# Patient Record
Sex: FEMALE | Race: WHITE
[De-identification: names, ages, dates, MRNs, and addresses within clinical notes are randomized per-mention and may not be internally consistent; named-entity substitution may affect disease eponyms.]

---

## 2017-04-09 ENCOUNTER — HOSPITAL ENCOUNTER (EMERGENCY)
Dept: HOSPITAL 83 - ED | Age: 77
Discharge: HOME | End: 2017-04-09
Payer: MEDICARE

## 2017-04-09 VITALS — HEIGHT: 55 IN

## 2017-04-09 VITALS — DIASTOLIC BLOOD PRESSURE: 81 MMHG

## 2017-04-09 DIAGNOSIS — I10: Primary | ICD-10-CM

## 2017-04-09 DIAGNOSIS — Z79.899: ICD-10-CM

## 2017-04-09 LAB
INR BLD: 1.1 (ref 2–3.5)
PROTHROMBIN TIME: 12.2 SECONDS (ref 9–12.4)

## 2017-07-28 ENCOUNTER — HOSPITAL ENCOUNTER (OUTPATIENT)
Dept: HOSPITAL 83 - RAD | Age: 77
End: 2017-07-28
Attending: INTERNAL MEDICINE
Payer: MEDICARE

## 2017-07-28 DIAGNOSIS — M81.0: Primary | ICD-10-CM

## 2017-10-13 ENCOUNTER — HOSPITAL ENCOUNTER (OUTPATIENT)
Dept: HOSPITAL 83 - US | Age: 77
Discharge: HOME | End: 2017-10-13
Attending: INTERNAL MEDICINE
Payer: MEDICARE

## 2017-10-13 DIAGNOSIS — I73.9: Primary | ICD-10-CM

## 2017-10-13 DIAGNOSIS — I10: ICD-10-CM

## 2018-01-04 PROBLEM — I48.91 ATRIAL FIBRILLATION (HCC): Status: ACTIVE | Noted: 2018-01-04

## 2018-01-12 ENCOUNTER — HOSPITAL ENCOUNTER (OUTPATIENT)
Dept: HOSPITAL 83 - ORTHO | Age: 78
Discharge: HOME | End: 2018-01-12
Attending: ORTHOPAEDIC SURGERY
Payer: MEDICARE

## 2018-01-12 DIAGNOSIS — M25.562: Primary | ICD-10-CM

## 2018-01-12 DIAGNOSIS — M17.12: ICD-10-CM

## 2018-02-23 ENCOUNTER — HOSPITAL ENCOUNTER (OUTPATIENT)
Dept: HOSPITAL 83 - ORTHO | Age: 78
Discharge: HOME | End: 2018-02-23
Attending: ORTHOPAEDIC SURGERY
Payer: MEDICARE

## 2018-02-23 DIAGNOSIS — M17.11: Primary | ICD-10-CM

## 2018-03-27 DIAGNOSIS — I48.91 ATRIAL FIBRILLATION, UNSPECIFIED TYPE (HCC): ICD-10-CM

## 2018-04-26 ENCOUNTER — HOSPITAL ENCOUNTER (EMERGENCY)
Dept: HOSPITAL 83 - ED | Age: 78
LOS: 1 days | Discharge: HOME | End: 2018-04-27
Payer: MEDICARE

## 2018-04-26 VITALS — DIASTOLIC BLOOD PRESSURE: 96 MMHG

## 2018-04-26 VITALS — WEIGHT: 151 LBS | HEIGHT: 61.97 IN | BODY MASS INDEX: 27.79 KG/M2

## 2018-04-26 DIAGNOSIS — Z79.82: ICD-10-CM

## 2018-04-26 DIAGNOSIS — L30.9: Primary | ICD-10-CM

## 2018-04-26 DIAGNOSIS — Z79.899: ICD-10-CM

## 2018-04-26 DIAGNOSIS — Z88.0: ICD-10-CM

## 2018-04-26 DIAGNOSIS — I10: ICD-10-CM

## 2018-06-27 ENCOUNTER — HOSPITAL ENCOUNTER (INPATIENT)
Dept: HOSPITAL 83 - ED | Age: 78
LOS: 1 days | Discharge: HOME | DRG: 303 | End: 2018-06-28
Attending: INTERNAL MEDICINE | Admitting: INTERNAL MEDICINE
Payer: MEDICARE

## 2018-06-27 VITALS — SYSTOLIC BLOOD PRESSURE: 163 MMHG | DIASTOLIC BLOOD PRESSURE: 75 MMHG

## 2018-06-27 VITALS — SYSTOLIC BLOOD PRESSURE: 170 MMHG | DIASTOLIC BLOOD PRESSURE: 88 MMHG

## 2018-06-27 VITALS — WEIGHT: 161.19 LBS | HEIGHT: 63 IN | BODY MASS INDEX: 28.56 KG/M2

## 2018-06-27 VITALS — SYSTOLIC BLOOD PRESSURE: 141 MMHG | DIASTOLIC BLOOD PRESSURE: 88 MMHG

## 2018-06-27 VITALS — DIASTOLIC BLOOD PRESSURE: 76 MMHG

## 2018-06-27 VITALS — DIASTOLIC BLOOD PRESSURE: 88 MMHG

## 2018-06-27 DIAGNOSIS — I48.0: ICD-10-CM

## 2018-06-27 DIAGNOSIS — Z79.01: ICD-10-CM

## 2018-06-27 DIAGNOSIS — Z79.899: ICD-10-CM

## 2018-06-27 DIAGNOSIS — Z82.49: ICD-10-CM

## 2018-06-27 DIAGNOSIS — I25.110: Primary | ICD-10-CM

## 2018-06-27 DIAGNOSIS — I48.2: ICD-10-CM

## 2018-06-27 DIAGNOSIS — A52.16: ICD-10-CM

## 2018-06-27 DIAGNOSIS — I10: ICD-10-CM

## 2018-06-27 DIAGNOSIS — M17.0: ICD-10-CM

## 2018-06-27 DIAGNOSIS — Z88.0: ICD-10-CM

## 2018-06-27 DIAGNOSIS — Z79.82: ICD-10-CM

## 2018-06-27 DIAGNOSIS — G62.9: ICD-10-CM

## 2018-06-27 LAB
ALBUMIN SERPL-MCNC: 3.5 GM/DL (ref 3.1–4.5)
ALP SERPL-CCNC: 68 U/L (ref 45–117)
ALT SERPL W P-5'-P-CCNC: 29 U/L (ref 12–78)
APPEARANCE UR: CLEAR
APTT PPP: 35.7 SECONDS (ref 20.8–31.5)
AST SERPL-CCNC: 16 IU/L (ref 3–35)
BASOPHILS # BLD AUTO: 0 10*3/UL (ref 0–0.1)
BASOPHILS NFR BLD AUTO: 0.8 % (ref 0–1)
BILIRUB UR QL STRIP: NEGATIVE
BUN SERPL-MCNC: 11 MG/DL (ref 7–24)
CHLORIDE SERPL-SCNC: 109 MMOL/L (ref 98–107)
COLOR UR: YELLOW
CREAT SERPL-MCNC: 0.58 MG/DL (ref 0.55–1.02)
EOSINOPHIL # BLD AUTO: 0.1 10*3/UL (ref 0–0.4)
EOSINOPHIL # BLD AUTO: 1.4 % (ref 1–4)
EPI CELLS #/AREA URNS HPF: (no result) /[HPF]
ERYTHROCYTE [DISTWIDTH] IN BLOOD BY AUTOMATED COUNT: 13.2 % (ref 0–14.5)
GLUCOSE UR QL: NEGATIVE
HCT VFR BLD AUTO: 41.3 % (ref 37–47)
HGB BLD-MCNC: 13.4 G/DL (ref 12–16)
HGB UR QL STRIP: NEGATIVE
INR BLD: 2.4 (ref 2–3.5)
KETONES UR QL STRIP: NEGATIVE
LEUKOCYTE ESTERASE UR QL STRIP: NEGATIVE
LYMPHOCYTES # BLD AUTO: 1.4 10*3/UL (ref 1.3–4.4)
LYMPHOCYTES NFR BLD AUTO: 29.3 % (ref 27–41)
MCH RBC QN AUTO: 29.3 PG (ref 27–31)
MCHC RBC AUTO-ENTMCNC: 32.4 G/DL (ref 33–37)
MCV RBC AUTO: 90.2 FL (ref 81–99)
MONOCYTES # BLD AUTO: 0.5 10*3/UL (ref 0.1–1)
MONOCYTES NFR BLD MANUAL: 9.8 % (ref 3–9)
NEUT #: 2.9 10*3/UL (ref 2.3–7.9)
NEUT %: 58.5 % (ref 47–73)
NITRITE UR QL STRIP: NEGATIVE
NRBC BLD QL AUTO: 0 10*3/UL (ref 0–0)
PH UR STRIP: 6.5 [PH] (ref 5–9)
PLATELET # BLD AUTO: 189 10*3/UL (ref 130–400)
PMV BLD AUTO: 9.1 FL (ref 9.6–12.3)
POTASSIUM SERPL-SCNC: 3.7 MMOL/L (ref 3.5–5.1)
PROT SERPL-MCNC: 6.7 GM/DL (ref 6.4–8.2)
RBC # BLD AUTO: 4.58 10*6/UL (ref 4.1–5.1)
SODIUM SERPL-SCNC: 144 MMOL/L (ref 136–145)
SP GR UR: 1.01 (ref 1–1.03)
TROPONIN I SERPL-MCNC: < 0.015 NG/ML (ref ?–0.04)
UROBILINOGEN UR STRIP-MCNC: 0.2 E.U./DL (ref 0.2–1)
WBC #/AREA URNS HPF: (no result) WBC/HPF (ref 0–5)
WBC NRBC COR # BLD AUTO: 4.9 10*3/UL (ref 4.8–10.8)
YEAST #/AREA URNS HPF: (no result) /[HPF]

## 2018-06-28 VITALS — SYSTOLIC BLOOD PRESSURE: 127 MMHG | DIASTOLIC BLOOD PRESSURE: 72 MMHG

## 2018-06-28 VITALS — DIASTOLIC BLOOD PRESSURE: 67 MMHG

## 2018-06-28 VITALS — DIASTOLIC BLOOD PRESSURE: 59 MMHG | SYSTOLIC BLOOD PRESSURE: 110 MMHG

## 2018-06-28 LAB
CHOLEST SERPL-MCNC: 206 MG/DL (ref ?–200)
HDLC SERPL-MCNC: 67 MG/DL (ref 40–60)
LDLC SERPL DIRECT ASSAY-MCNC: 124 MG/DL (ref 9–159)
LV EF: 86 %
LVEF MODALITY: NORMAL
TRIGL SERPL-MCNC: 74 MG/DL (ref ?–150)
VLDLC SERPL CALC-MCNC: 15 MG/DL (ref 6–40)

## 2018-06-28 PROCEDURE — 3E073KZ INTRODUCTION OF OTHER DIAGNOSTIC SUBSTANCE INTO CORONARY ARTERY, PERCUTANEOUS APPROACH: ICD-10-PCS

## 2018-06-28 PROCEDURE — 4A02XM4 MEASUREMENT OF CARDIAC TOTAL ACTIVITY, EXTERNAL APPROACH: ICD-10-PCS

## 2018-07-06 ENCOUNTER — TELEPHONE (OUTPATIENT)
Dept: CARDIOLOGY CLINIC | Age: 78
End: 2018-07-06

## 2018-07-06 NOTE — TELEPHONE ENCOUNTER
Cordelia Pearce called and made appointment for Hospital f/up. Today she just wanted you to know her INR was 2.7. Does she continue same dose?

## 2018-07-07 NOTE — TELEPHONE ENCOUNTER
An INR of 2.7 is therapeutic. She should continue the same warfarin dose and recheck her INR in 4 weeks. Thanks!   DAB

## 2018-08-09 DIAGNOSIS — I48.91 ATRIAL FIBRILLATION, UNSPECIFIED TYPE (HCC): ICD-10-CM

## 2018-08-09 LAB
INR BLD: NORMAL
PROTIME: NORMAL SECONDS

## 2018-09-11 ENCOUNTER — HOSPITAL ENCOUNTER (EMERGENCY)
Dept: HOSPITAL 83 - ED | Age: 78
Discharge: HOME | End: 2018-09-11
Payer: MEDICARE

## 2018-09-11 VITALS — WEIGHT: 153 LBS | BODY MASS INDEX: 27.11 KG/M2 | HEIGHT: 62.99 IN

## 2018-09-11 VITALS — DIASTOLIC BLOOD PRESSURE: 89 MMHG | SYSTOLIC BLOOD PRESSURE: 155 MMHG

## 2018-09-11 DIAGNOSIS — Z79.01: ICD-10-CM

## 2018-09-11 DIAGNOSIS — R07.81: Primary | ICD-10-CM

## 2018-09-11 DIAGNOSIS — Z88.0: ICD-10-CM

## 2018-09-11 DIAGNOSIS — Z88.8: ICD-10-CM

## 2018-09-11 DIAGNOSIS — Z79.899: ICD-10-CM

## 2018-09-11 DIAGNOSIS — R06.00: ICD-10-CM

## 2018-09-11 DIAGNOSIS — I10: ICD-10-CM

## 2018-09-18 ENCOUNTER — HOSPITAL ENCOUNTER (OUTPATIENT)
Dept: HOSPITAL 83 - CT | Age: 78
Discharge: HOME | End: 2018-09-18
Attending: INTERNAL MEDICINE
Payer: MEDICARE

## 2018-09-18 DIAGNOSIS — I25.10: ICD-10-CM

## 2018-09-18 DIAGNOSIS — M79.621: ICD-10-CM

## 2018-09-18 DIAGNOSIS — I70.0: Primary | ICD-10-CM

## 2018-10-31 ENCOUNTER — HOSPITAL ENCOUNTER (OUTPATIENT)
Dept: HOSPITAL 83 - LAB | Age: 78
Discharge: HOME | End: 2018-10-31
Attending: ORTHOPAEDIC SURGERY
Payer: MEDICARE

## 2018-10-31 DIAGNOSIS — M25.461: Primary | ICD-10-CM

## 2018-10-31 LAB
BASOPHILS # BLD AUTO: 0.1 10*3/UL (ref 0–0.1)
BASOPHILS NFR BLD AUTO: 0.9 % (ref 0–1)
EOSINOPHIL # BLD AUTO: 0.1 10*3/UL (ref 0–0.4)
EOSINOPHIL # BLD AUTO: 2.1 % (ref 1–4)
ERYTHROCYTE [DISTWIDTH] IN BLOOD BY AUTOMATED COUNT: 13.8 % (ref 0–14.5)
HCT VFR BLD AUTO: 42.2 % (ref 37–47)
HGB BLD-MCNC: 13.8 G/DL (ref 12–16)
LYMPHOCYTES # BLD AUTO: 2.2 10*3/UL (ref 1.3–4.4)
LYMPHOCYTES NFR BLD AUTO: 39.7 % (ref 27–41)
MCH RBC QN AUTO: 29.7 PG (ref 27–31)
MCHC RBC AUTO-ENTMCNC: 32.7 G/DL (ref 33–37)
MCV RBC AUTO: 90.9 FL (ref 81–99)
MONOCYTES # BLD AUTO: 0.6 10*3/UL (ref 0.1–1)
MONOCYTES NFR BLD MANUAL: 10.1 % (ref 3–9)
NEUT #: 2.6 10*3/UL (ref 2.3–7.9)
NEUT %: 46.8 % (ref 47–73)
NRBC BLD QL AUTO: 0 % (ref 0–0)
PLATELET # BLD AUTO: 194 10*3/UL (ref 130–400)
PMV BLD AUTO: 9.1 FL (ref 9.6–12.3)
RBC # BLD AUTO: 4.64 10*6/UL (ref 4.1–5.1)
WBC NRBC COR # BLD AUTO: 5.6 10*3/UL (ref 4.8–10.8)

## 2018-11-06 ENCOUNTER — OFFICE VISIT (OUTPATIENT)
Dept: CARDIOLOGY CLINIC | Age: 78
End: 2018-11-06
Payer: MEDICARE

## 2018-11-06 VITALS
WEIGHT: 160 LBS | HEIGHT: 63 IN | DIASTOLIC BLOOD PRESSURE: 78 MMHG | RESPIRATION RATE: 16 BRPM | BODY MASS INDEX: 28.35 KG/M2 | SYSTOLIC BLOOD PRESSURE: 122 MMHG

## 2018-11-06 DIAGNOSIS — I48.0 PAROXYSMAL ATRIAL FIBRILLATION (HCC): Primary | ICD-10-CM

## 2018-11-06 PROCEDURE — 1101F PT FALLS ASSESS-DOCD LE1/YR: CPT | Performed by: INTERNAL MEDICINE

## 2018-11-06 PROCEDURE — G8484 FLU IMMUNIZE NO ADMIN: HCPCS | Performed by: INTERNAL MEDICINE

## 2018-11-06 PROCEDURE — G8427 DOCREV CUR MEDS BY ELIG CLIN: HCPCS | Performed by: INTERNAL MEDICINE

## 2018-11-06 PROCEDURE — 1123F ACP DISCUSS/DSCN MKR DOCD: CPT | Performed by: INTERNAL MEDICINE

## 2018-11-06 PROCEDURE — 4040F PNEUMOC VAC/ADMIN/RCVD: CPT | Performed by: INTERNAL MEDICINE

## 2018-11-06 PROCEDURE — G8400 PT W/DXA NO RESULTS DOC: HCPCS | Performed by: INTERNAL MEDICINE

## 2018-11-06 PROCEDURE — G8419 CALC BMI OUT NRM PARAM NOF/U: HCPCS | Performed by: INTERNAL MEDICINE

## 2018-11-06 PROCEDURE — 93000 ELECTROCARDIOGRAM COMPLETE: CPT | Performed by: INTERNAL MEDICINE

## 2018-11-06 PROCEDURE — 99213 OFFICE O/P EST LOW 20 MIN: CPT | Performed by: INTERNAL MEDICINE

## 2018-11-06 PROCEDURE — 1090F PRES/ABSN URINE INCON ASSESS: CPT | Performed by: INTERNAL MEDICINE

## 2018-11-06 PROCEDURE — 1036F TOBACCO NON-USER: CPT | Performed by: INTERNAL MEDICINE

## 2018-11-26 ENCOUNTER — TELEPHONE (OUTPATIENT)
Dept: CARDIOLOGY CLINIC | Age: 78
End: 2018-11-26

## 2018-11-28 ENCOUNTER — HOSPITAL ENCOUNTER (OUTPATIENT)
Dept: HOSPITAL 83 - US | Age: 78
Discharge: HOME | End: 2018-11-28
Attending: INTERNAL MEDICINE
Payer: MEDICARE

## 2018-11-28 DIAGNOSIS — M71.22: Primary | ICD-10-CM

## 2018-11-28 DIAGNOSIS — M79.81: ICD-10-CM

## 2018-12-07 ENCOUNTER — HOSPITAL ENCOUNTER (OUTPATIENT)
Dept: HOSPITAL 83 - MRI | Age: 78
Discharge: HOME | End: 2018-12-07
Attending: INTERNAL MEDICINE
Payer: MEDICARE

## 2018-12-07 ENCOUNTER — TELEPHONE (OUTPATIENT)
Dept: CARDIOLOGY CLINIC | Age: 78
End: 2018-12-07
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
## 2018-12-07 DIAGNOSIS — S06.5X0A: Primary | ICD-10-CM

## 2018-12-11 ENCOUNTER — TELEPHONE (OUTPATIENT)
Dept: CARDIOLOGY CLINIC | Age: 78
End: 2018-12-11

## 2018-12-12 ENCOUNTER — HOSPITAL ENCOUNTER (EMERGENCY)
Dept: HOSPITAL 83 - ED | Age: 78
Discharge: HOME | End: 2018-12-12
Payer: MEDICARE

## 2018-12-12 VITALS — BODY MASS INDEX: 26.75 KG/M2 | HEIGHT: 62.99 IN | WEIGHT: 151 LBS

## 2018-12-12 VITALS — SYSTOLIC BLOOD PRESSURE: 141 MMHG | DIASTOLIC BLOOD PRESSURE: 92 MMHG

## 2018-12-12 DIAGNOSIS — S10.93XA: ICD-10-CM

## 2018-12-12 DIAGNOSIS — M79.604: ICD-10-CM
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
## 2018-12-12 DIAGNOSIS — S00.03XA: Primary | ICD-10-CM

## 2018-12-12 DIAGNOSIS — Z79.01: ICD-10-CM

## 2018-12-12 DIAGNOSIS — R09.81: ICD-10-CM
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
## 2018-12-12 DIAGNOSIS — Z88.8: ICD-10-CM

## 2018-12-12 DIAGNOSIS — Z88.0: ICD-10-CM

## 2018-12-12 DIAGNOSIS — Z79.899: ICD-10-CM

## 2018-12-12 DIAGNOSIS — R51: ICD-10-CM

## 2018-12-12 DIAGNOSIS — I48.0: ICD-10-CM

## 2018-12-12 DIAGNOSIS — R04.0: ICD-10-CM

## 2018-12-12 DIAGNOSIS — Z79.02: ICD-10-CM
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
## 2018-12-12 DIAGNOSIS — M54.5: ICD-10-CM

## 2018-12-12 DIAGNOSIS — M25.551: ICD-10-CM

## 2018-12-12 LAB
ALBUMIN SERPL-MCNC: 3.4 GM/DL (ref 3.1–4.5)
ALP SERPL-CCNC: 90 U/L (ref 45–117)
ALT SERPL W P-5'-P-CCNC: 32 U/L (ref 12–78)
APPEARANCE UR: (no result)
APTT PPP: 25.5 SECONDS (ref 20.8–31.5)
AST SERPL-CCNC: 23 IU/L (ref 3–35)
BACTERIA #/AREA URNS HPF: (no result) /[HPF]
BASOPHILS # BLD AUTO: 0 10*3/UL (ref 0–0.1)
BASOPHILS NFR BLD AUTO: 0.6 % (ref 0–1)
BILIRUB UR QL STRIP: NEGATIVE
BUN SERPL-MCNC: 12 MG/DL (ref 7–24)
CHLORIDE SERPL-SCNC: 105 MMOL/L (ref 98–107)
COLOR UR: YELLOW
CREAT SERPL-MCNC: 0.49 MG/DL (ref 0.55–1.02)
EOSINOPHIL # BLD AUTO: 0.1 10*3/UL (ref 0–0.4)
EOSINOPHIL # BLD AUTO: 1 % (ref 1–4)
EPI CELLS #/AREA URNS HPF: (no result) /[HPF]
ERYTHROCYTE [DISTWIDTH] IN BLOOD BY AUTOMATED COUNT: 13.7 % (ref 0–14.5)
GLUCOSE UR QL: NEGATIVE
HCT VFR BLD AUTO: 38.7 % (ref 37–47)
HGB BLD-MCNC: 12.5 G/DL (ref 12–16)
HGB UR QL STRIP: NEGATIVE
INR BLD: 1 (ref 2–3.5)
KETONES UR QL STRIP: NEGATIVE
LEUKOCYTE ESTERASE UR QL STRIP: NEGATIVE
LYMPHOCYTES # BLD AUTO: 1.3 10*3/UL (ref 1.3–4.4)
LYMPHOCYTES NFR BLD AUTO: 27.7 % (ref 27–41)
MCH RBC QN AUTO: 29.6 PG (ref 27–31)
MCHC RBC AUTO-ENTMCNC: 32.3 G/DL (ref 33–37)
MCV RBC AUTO: 91.5 FL (ref 81–99)
MONOCYTES # BLD AUTO: 0.5 10*3/UL (ref 0.1–1)
MONOCYTES NFR BLD MANUAL: 10.6 % (ref 3–9)
NEUT #: 2.9 10*3/UL (ref 2.3–7.9)
NEUT %: 59.7 % (ref 47–73)
NITRITE UR QL STRIP: NEGATIVE
NRBC BLD QL AUTO: 0 % (ref 0–0)
PH UR STRIP: 6.5 [PH] (ref 5–9)
PLATELET # BLD AUTO: 228 10*3/UL (ref 130–400)
PMV BLD AUTO: 8.5 FL (ref 9.6–12.3)
POTASSIUM SERPL-SCNC: 4.3 MMOL/L (ref 3.5–5.1)
PROT SERPL-MCNC: 7.1 GM/DL (ref 6.4–8.2)
RBC # BLD AUTO: 4.23 10*6/UL (ref 4.1–5.1)
RBC #/AREA URNS HPF: (no result) RBC/HPF (ref 0–2)
SODIUM SERPL-SCNC: 138 MMOL/L (ref 136–145)
SP GR UR: <= 1.005 (ref 1–1.03)
UROBILINOGEN UR STRIP-MCNC: 0.2 E.U./DL (ref 0.2–1)
WBC #/AREA URNS HPF: (no result) WBC/HPF (ref 0–5)
WBC NRBC COR # BLD AUTO: 4.8 10*3/UL (ref 4.8–10.8)

## 2018-12-27 ENCOUNTER — TELEPHONE (OUTPATIENT)
Dept: CARDIOLOGY CLINIC | Age: 78
End: 2018-12-27

## 2018-12-27 NOTE — TELEPHONE ENCOUNTER
Frederic Mi called again, she is now taking Celelbrex and wants to know if OK to take with Warfarin, she is having a whole body MRI next week and really prefers to stay off of Warfarin till after that but wants to do what you tell her??

## 2019-01-02 ENCOUNTER — HOSPITAL ENCOUNTER (OUTPATIENT)
Age: 79
Discharge: HOME | End: 2019-01-02
Payer: MEDICARE

## 2019-01-02 ENCOUNTER — TELEPHONE (OUTPATIENT)
Dept: CARDIOLOGY CLINIC | Age: 79
End: 2019-01-02
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
## 2019-01-02 DIAGNOSIS — M48.07: ICD-10-CM

## 2019-01-02 DIAGNOSIS — M51.26: ICD-10-CM
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
## 2019-01-02 DIAGNOSIS — M43.17: ICD-10-CM

## 2019-01-02 DIAGNOSIS — M12.88: ICD-10-CM
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
## 2019-01-02 DIAGNOSIS — S32.19XA: Primary | ICD-10-CM

## 2019-01-03 RX ORDER — WARFARIN SODIUM 5 MG/1
TABLET ORAL
Qty: 90 TABLET | Refills: 3 | Status: SHIPPED | OUTPATIENT
Start: 2019-01-03 | End: 2020-01-15 | Stop reason: DRUGHIGH

## 2019-01-06 ENCOUNTER — HOSPITAL ENCOUNTER (INPATIENT)
Dept: HOSPITAL 83 - ED | Age: 79
LOS: 4 days | Discharge: OTHER INSTITUTION | DRG: 552 | End: 2019-01-10
Attending: INTERNAL MEDICINE | Admitting: INTERNAL MEDICINE
Payer: MEDICARE

## 2019-01-06 VITALS — SYSTOLIC BLOOD PRESSURE: 139 MMHG | DIASTOLIC BLOOD PRESSURE: 66 MMHG

## 2019-01-06 VITALS — WEIGHT: 158.56 LBS | BODY MASS INDEX: 28.09 KG/M2 | HEIGHT: 62.99 IN

## 2019-01-06 VITALS — DIASTOLIC BLOOD PRESSURE: 63 MMHG

## 2019-01-06 VITALS — DIASTOLIC BLOOD PRESSURE: 68 MMHG

## 2019-01-06 VITALS — DIASTOLIC BLOOD PRESSURE: 80 MMHG

## 2019-01-06 DIAGNOSIS — Z88.8: ICD-10-CM
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
## 2019-01-06 DIAGNOSIS — M48.062: ICD-10-CM

## 2019-01-06 DIAGNOSIS — Z79.899: ICD-10-CM
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
## 2019-01-06 DIAGNOSIS — I48.2: ICD-10-CM
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
## 2019-01-06 DIAGNOSIS — R62.7: ICD-10-CM

## 2019-01-06 DIAGNOSIS — I10: ICD-10-CM

## 2019-01-06 DIAGNOSIS — S32.592A: ICD-10-CM

## 2019-01-06 DIAGNOSIS — Z82.49: ICD-10-CM

## 2019-01-06 DIAGNOSIS — S32.10XA: Primary | ICD-10-CM
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
## 2019-01-06 DIAGNOSIS — R09.02: ICD-10-CM

## 2019-01-06 LAB
ALBUMIN SERPL-MCNC: 3.4 GM/DL (ref 3.1–4.5)
ALP SERPL-CCNC: 155 U/L (ref 45–117)
ALT SERPL W P-5'-P-CCNC: 26 U/L (ref 12–78)
APTT PPP: 33.3 SECONDS (ref 20.8–31.5)
AST SERPL-CCNC: 24 IU/L (ref 3–35)
BASOPHILS # BLD AUTO: 0 10*3/UL (ref 0–0.1)
BASOPHILS NFR BLD AUTO: 0.7 % (ref 0–1)
BUN SERPL-MCNC: 9 MG/DL (ref 7–24)
CHLORIDE SERPL-SCNC: 108 MMOL/L (ref 98–107)
CREAT SERPL-MCNC: 0.62 MG/DL (ref 0.55–1.02)
EOSINOPHIL # BLD AUTO: 0.1 10*3/UL (ref 0–0.4)
EOSINOPHIL # BLD AUTO: 0.8 % (ref 1–4)
ERYTHROCYTE [DISTWIDTH] IN BLOOD BY AUTOMATED COUNT: 13.2 % (ref 0–14.5)
HCT VFR BLD AUTO: 41 % (ref 37–47)
HGB BLD-MCNC: 13.3 G/DL (ref 12–16)
INR BLD: 1.9 (ref 2–3.5)
LYMPHOCYTES # BLD AUTO: 1.3 10*3/UL (ref 1.3–4.4)
LYMPHOCYTES NFR BLD AUTO: 22.2 % (ref 27–41)
MCH RBC QN AUTO: 29.2 PG (ref 27–31)
MCHC RBC AUTO-ENTMCNC: 32.4 G/DL (ref 33–37)
MCV RBC AUTO: 89.9 FL (ref 81–99)
MONOCYTES # BLD AUTO: 0.4 10*3/UL (ref 0.1–1)
MONOCYTES NFR BLD MANUAL: 7.3 % (ref 3–9)
NEUT #: 4.1 10*3/UL (ref 2.3–7.9)
NEUT %: 68.8 % (ref 47–73)
NRBC BLD QL AUTO: 0 % (ref 0–0)
PLATELET # BLD AUTO: 217 10*3/UL (ref 130–400)
PMV BLD AUTO: 8.9 FL (ref 9.6–12.3)
POTASSIUM SERPL-SCNC: 3.9 MMOL/L (ref 3.5–5.1)
PROT SERPL-MCNC: 7 GM/DL (ref 6.4–8.2)
RBC # BLD AUTO: 4.56 10*6/UL (ref 4.1–5.1)
SODIUM SERPL-SCNC: 140 MMOL/L (ref 136–145)
WBC NRBC COR # BLD AUTO: 5.9 10*3/UL (ref 4.8–10.8)

## 2019-01-07 VITALS — DIASTOLIC BLOOD PRESSURE: 65 MMHG | SYSTOLIC BLOOD PRESSURE: 133 MMHG

## 2019-01-07 VITALS — DIASTOLIC BLOOD PRESSURE: 65 MMHG | SYSTOLIC BLOOD PRESSURE: 130 MMHG

## 2019-01-07 VITALS — DIASTOLIC BLOOD PRESSURE: 70 MMHG

## 2019-01-07 VITALS — DIASTOLIC BLOOD PRESSURE: 59 MMHG

## 2019-01-07 VITALS — SYSTOLIC BLOOD PRESSURE: 146 MMHG | DIASTOLIC BLOOD PRESSURE: 54 MMHG

## 2019-01-08 VITALS — DIASTOLIC BLOOD PRESSURE: 70 MMHG | SYSTOLIC BLOOD PRESSURE: 135 MMHG

## 2019-01-08 VITALS — DIASTOLIC BLOOD PRESSURE: 71 MMHG | SYSTOLIC BLOOD PRESSURE: 119 MMHG

## 2019-01-08 VITALS — SYSTOLIC BLOOD PRESSURE: 127 MMHG | DIASTOLIC BLOOD PRESSURE: 72 MMHG

## 2019-01-08 VITALS — DIASTOLIC BLOOD PRESSURE: 65 MMHG

## 2019-01-08 VITALS — DIASTOLIC BLOOD PRESSURE: 75 MMHG | SYSTOLIC BLOOD PRESSURE: 145 MMHG

## 2019-01-08 LAB — INR BLD: 1.7 (ref 2–3.5)

## 2019-01-09 VITALS — SYSTOLIC BLOOD PRESSURE: 97 MMHG | DIASTOLIC BLOOD PRESSURE: 54 MMHG

## 2019-01-09 VITALS — SYSTOLIC BLOOD PRESSURE: 118 MMHG | DIASTOLIC BLOOD PRESSURE: 69 MMHG

## 2019-01-09 VITALS — DIASTOLIC BLOOD PRESSURE: 60 MMHG

## 2019-01-09 VITALS — DIASTOLIC BLOOD PRESSURE: 73 MMHG

## 2019-01-09 VITALS — DIASTOLIC BLOOD PRESSURE: 62 MMHG

## 2019-01-09 LAB — INR BLD: 1.6 (ref 2–3.5)

## 2019-01-10 VITALS — DIASTOLIC BLOOD PRESSURE: 65 MMHG

## 2019-01-10 VITALS — DIASTOLIC BLOOD PRESSURE: 60 MMHG

## 2019-01-22 ENCOUNTER — HOSPITAL ENCOUNTER (OUTPATIENT)
Dept: HOSPITAL 83 - ORTHO | Age: 79
Discharge: HOME | End: 2019-01-22
Attending: ORTHOPAEDIC SURGERY
Payer: MEDICARE
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
## 2019-01-22 DIAGNOSIS — S32.10XD: Primary | ICD-10-CM

## 2019-02-14 ENCOUNTER — HOSPITAL ENCOUNTER (OUTPATIENT)
Dept: HOSPITAL 83 - ORTHO | Age: 79
Discharge: HOME | End: 2019-02-14
Attending: ORTHOPAEDIC SURGERY
Payer: MEDICARE

## 2019-02-14 DIAGNOSIS — S32.10XD: Primary | ICD-10-CM
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
## 2019-02-19 ENCOUNTER — HOSPITAL ENCOUNTER (OUTPATIENT)
Dept: HOSPITAL 83 - CT | Age: 79
Discharge: HOME | End: 2019-02-19
Attending: ORTHOPAEDIC SURGERY
Payer: MEDICARE
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
## 2019-02-19 DIAGNOSIS — K57.30: ICD-10-CM

## 2019-02-19 DIAGNOSIS — S32.592D: ICD-10-CM

## 2019-02-19 DIAGNOSIS — S32.10XD: Primary | ICD-10-CM

## 2019-02-19 DIAGNOSIS — M47.818: ICD-10-CM

## 2019-02-19 DIAGNOSIS — I70.90: ICD-10-CM

## 2019-02-19 DIAGNOSIS — M48.07: ICD-10-CM

## 2019-02-19 DIAGNOSIS — M16.0: ICD-10-CM

## 2019-03-14 DIAGNOSIS — I48.91 ATRIAL FIBRILLATION, UNSPECIFIED TYPE (HCC): Primary | ICD-10-CM

## 2019-04-17 ENCOUNTER — OFFICE VISIT (OUTPATIENT)
Dept: CARDIOLOGY CLINIC | Age: 79
End: 2019-04-17
Payer: MEDICARE

## 2019-04-17 VITALS
HEART RATE: 68 BPM | BODY MASS INDEX: 26.58 KG/M2 | HEIGHT: 63 IN | SYSTOLIC BLOOD PRESSURE: 128 MMHG | DIASTOLIC BLOOD PRESSURE: 82 MMHG | WEIGHT: 150 LBS | RESPIRATION RATE: 16 BRPM

## 2019-04-17 DIAGNOSIS — I48.91 ATRIAL FIBRILLATION, UNSPECIFIED TYPE (HCC): Primary | ICD-10-CM

## 2019-04-17 PROCEDURE — 1123F ACP DISCUSS/DSCN MKR DOCD: CPT | Performed by: INTERNAL MEDICINE

## 2019-04-17 PROCEDURE — G8427 DOCREV CUR MEDS BY ELIG CLIN: HCPCS | Performed by: INTERNAL MEDICINE

## 2019-04-17 PROCEDURE — G8419 CALC BMI OUT NRM PARAM NOF/U: HCPCS | Performed by: INTERNAL MEDICINE

## 2019-04-17 PROCEDURE — 4040F PNEUMOC VAC/ADMIN/RCVD: CPT | Performed by: INTERNAL MEDICINE

## 2019-04-17 PROCEDURE — 1036F TOBACCO NON-USER: CPT | Performed by: INTERNAL MEDICINE

## 2019-04-17 PROCEDURE — 1090F PRES/ABSN URINE INCON ASSESS: CPT | Performed by: INTERNAL MEDICINE

## 2019-04-17 PROCEDURE — 99214 OFFICE O/P EST MOD 30 MIN: CPT | Performed by: INTERNAL MEDICINE

## 2019-04-17 PROCEDURE — 93000 ELECTROCARDIOGRAM COMPLETE: CPT | Performed by: INTERNAL MEDICINE

## 2019-04-17 PROCEDURE — G8400 PT W/DXA NO RESULTS DOC: HCPCS | Performed by: INTERNAL MEDICINE

## 2019-04-17 RX ORDER — MULTIVIT-MIN/IRON/FOLIC ACID/K 18-600-40
CAPSULE ORAL DAILY
COMMUNITY
End: 2020-05-05

## 2019-04-17 NOTE — PROGRESS NOTES
CHIEF COMPLAINT: PAF    HISTORY OF PRESENT ILLNESS: Patient is a 78 y.o. female seen at the request of Remy Gilbert MD.      No CP or SOB. No palpitations. Medical history includes:  1. Hypertension. 2. Atrial fibrillation documented around 2014. 3. No history of diabetes, MI or stroke. 4. History of peripheral neuropathy with a Charcot joint in her right foot.  The patient states that she was told that she was very unusual because she does not have diabetes. 5. Family history negative for early coronary disease. 6. Hospitalization, 06/27/2018, for chest discomfort, myocardial infarction was ruled out. 7. Pharmacologic myocardial perfusion study, 06/28/2018, normal perfusion images, ejection fraction 86%, low risk study.       Past Medical History:   Diagnosis Date    Atrial fibrillation (HCC)     HTN (hypertension)        Patient Active Problem List   Diagnosis    Atrial fibrillation (HCC)       Allergies   Allergen Reactions    Cortisone Palpitations       Current Outpatient Medications   Medication Sig Dispense Refill    Calcium Polycarbophil (FIBER-CAPS PO) Take by mouth daily      Calcium 200 MG TABS Take 400 mg by mouth daily      Glucosamine HCl ([employer] GLUCOSAMINE PO) Take by mouth daily      Cholecalciferol (VITAMIN D) 2000 units CAPS capsule Take by mouth daily      Multiple Vitamins-Minerals (MULTIVITAMIN ADULT PO) Take by mouth daily      ascorbic acid (VITAMIN C) 1000 MG tablet Take 1,000 mg by mouth      warfarin (COUMADIN) 5 MG tablet Take daily as directed.  90 tablet 3    warfarin (COUMADIN) 2 MG tablet Take 1 tablet by mouth daily (Patient taking differently: Take 1 mg by mouth daily ) 90 tablet 3    gabapentin (NEURONTIN) 100 MG capsule Take 100 mg by mouth 3 times daily       metoprolol succinate (TOPROL XL) 25 MG extended release tablet Take 25 mg by mouth daily       amLODIPine (NORVASC) 5 MG tablet TAKE ONE TABLET BY MOUTH ONCE DAILY  3     No current facility-administered medications for this visit. Social History     Socioeconomic History    Marital status: Single     Spouse name: Not on file    Number of children: Not on file    Years of education: Not on file    Highest education level: Not on file   Occupational History    Not on file   Social Needs    Financial resource strain: Not on file    Food insecurity:     Worry: Not on file     Inability: Not on file    Transportation needs:     Medical: Not on file     Non-medical: Not on file   Tobacco Use    Smoking status: Former Smoker    Smokeless tobacco: Never Used   Substance and Sexual Activity    Alcohol use: Yes    Drug use: No    Sexual activity: Not on file   Lifestyle    Physical activity:     Days per week: Not on file     Minutes per session: Not on file    Stress: Not on file   Relationships    Social connections:     Talks on phone: Not on file     Gets together: Not on file     Attends Muslim service: Not on file     Active member of club or organization: Not on file     Attends meetings of clubs or organizations: Not on file     Relationship status: Not on file    Intimate partner violence:     Fear of current or ex partner: Not on file     Emotionally abused: Not on file     Physically abused: Not on file     Forced sexual activity: Not on file   Other Topics Concern    Not on file   Social History Narrative    Not on file       History reviewed. No pertinent family history. Review of Systems:  Heart: as above   Lungs: as above   Eyes: denies changes in vision or discharge. Ears: denies changes in hearing or pain. Nose: denies epistaxis or masses   Throat: denies sore throat or trouble swallowing. Neuro: denies numbness, tingling, tremors. Skin: denies rashes or itching. : denies hematuria, dysuria   GI: denies vomiting, diarrhea   Psych: denies mood changed, anxiety, depression. All other systems negative.     Physical Exam   /82   Pulse 68 Resp 16   Ht 5' 3\" (1.6 m)   Wt 150 lb (68 kg)   BMI 26.57 kg/m²   Constitutional: Oriented to person, place, and time. Well-developed and well-nourished. No distress. Head: Normocephalic and atraumatic. Eyes: EOM are normal. Pupils are equal, round, and reactive to light. Neck: Normal range of motion. Neck supple. No hepatojugular reflux and no JVD present. Carotid bruit is not present. No tracheal deviation present. No thyromegaly present. Cardiovascular: Normal rate, regular rhythm, normal heart sounds and intact distal pulses. Exam reveals no gallop and no friction rub. No murmur heard. Pulmonary/Chest: Effort normal and breath sounds normal. No respiratory distress. No wheezes. No rales. No tenderness. Abdominal: Soft. Bowel sounds are normal. No distension and no mass. No tenderness. No rebound and no guarding. Musculoskeletal: Normal range of motion. No edema and no tenderness. Lymphadenopathy:   No cervical adenopathy. No groin adenopathy. Neurological: Alert and oriented to person, place, and time. Skin: Skin is warm and dry. No rash noted. Not diaphoretic. No erythema. Psychiatric: Normal mood and affect. Behavior is normal.     EKG:  normal EKG, normal sinus rhythm. ASSESSMENT AND PLAN:  Patient Active Problem List   Diagnosis    Atrial fibrillation ([address]     1. Afib: In sinus. Normal echo 1/9/19. BB and warfarin. 2. HTN: Observe. 3. Peripheral neuropathy.     Jorge L Lindo D.O.   Cardiologist  Cardiology, [facility]

## 2019-05-30 ENCOUNTER — TELEPHONE (OUTPATIENT)
Dept: CARDIOLOGY CLINIC | Age: 79
End: 2019-05-30

## 2019-05-30 NOTE — TELEPHONE ENCOUNTER
I previously routed Elissa's INR to you and her dose is 5 mg daily    Laura Bourgeois called back wants me to let you know that she is take CBD oil for intense knee pain not sure if that will affect her INR

## 2019-06-04 NOTE — TELEPHONE ENCOUNTER
Spoke to Wilfredo gave instructions per Dr. Virl Goltz.  She understood and will have INR checked in 1 week

## 2019-06-04 NOTE — TELEPHONE ENCOUNTER
Take warfarin 7.5 mg M-W-F and 5 mg others. Recheck 1 week. Austinpetty Neely D.O.   Cardiologist  Cardiology, [facility]

## 2019-06-12 ENCOUNTER — TELEPHONE (OUTPATIENT)
Dept: CARDIOLOGY CLINIC | Age: 79
End: 2019-06-12

## 2019-06-21 ENCOUNTER — TELEPHONE (OUTPATIENT)
Dept: CARDIOLOGY CLINIC | Age: 79
End: 2019-06-21

## 2019-06-27 ENCOUNTER — TELEPHONE (OUTPATIENT)
Dept: CARDIOLOGY CLINIC | Age: 79
End: 2019-06-27

## 2019-07-03 ENCOUNTER — HOSPITAL ENCOUNTER (OUTPATIENT)
Dept: HOSPITAL 83 - MRI | Age: 79
Discharge: HOME | End: 2019-07-03
Payer: MEDICARE

## 2019-07-03 DIAGNOSIS — M48.07: ICD-10-CM

## 2019-07-03 DIAGNOSIS — M48.061: ICD-10-CM

## 2019-07-03 DIAGNOSIS — M43.16: Primary | ICD-10-CM

## 2019-08-27 ENCOUNTER — TELEPHONE (OUTPATIENT)
Dept: CARDIOLOGY CLINIC | Age: 79
End: 2019-08-27

## 2019-08-27 NOTE — TELEPHONE ENCOUNTER
Dr. Alatorre Senegal office called, El Connolly is scheduled for a total knee replacement on Oct 1, 2019. Are you able to clear her from her visit in April 2019? If so can you add a note?  Thank you

## 2019-09-06 ENCOUNTER — HOSPITAL ENCOUNTER (OUTPATIENT)
Dept: HOSPITAL 83 - LAB | Age: 79
Discharge: HOME | End: 2019-09-06
Attending: ORTHOPAEDIC SURGERY
Payer: MEDICARE

## 2019-09-06 DIAGNOSIS — Z79.899: ICD-10-CM

## 2019-09-06 DIAGNOSIS — I10: Primary | ICD-10-CM

## 2019-09-06 DIAGNOSIS — M19.90: ICD-10-CM

## 2019-09-06 DIAGNOSIS — M79.609: ICD-10-CM

## 2019-09-06 LAB
ALBUMIN SERPL-MCNC: 3.5 GM/DL (ref 3.1–4.5)
ALP SERPL-CCNC: 78 U/L (ref 45–117)
ALT SERPL W P-5'-P-CCNC: 22 U/L (ref 12–78)
APPEARANCE UR: (no result)
APTT PPP: 31.9 SECONDS (ref 20–32.1)
AST SERPL-CCNC: 18 IU/L (ref 3–35)
BACTERIA #/AREA URNS HPF: (no result) /[HPF]
BASOPHILS # BLD AUTO: 0.1 10*3/UL (ref 0–0.1)
BASOPHILS NFR BLD AUTO: 0.9 % (ref 0–1)
BILIRUB UR QL STRIP: NEGATIVE
BUN SERPL-MCNC: 11 MG/DL (ref 7–24)
CHLORIDE SERPL-SCNC: 108 MMOL/L (ref 98–107)
COLOR UR: YELLOW
CREAT SERPL-MCNC: 0.52 MG/DL (ref 0.55–1.02)
EOSINOPHIL # BLD AUTO: 0.1 10*3/UL (ref 0–0.4)
EOSINOPHIL # BLD AUTO: 1.9 % (ref 1–4)
EPI CELLS #/AREA URNS HPF: (no result) /[HPF]
ERYTHROCYTE [DISTWIDTH] IN BLOOD BY AUTOMATED COUNT: 13.6 % (ref 0–14.5)
GLUCOSE UR QL: NEGATIVE
HCT VFR BLD AUTO: 43.3 % (ref 37–47)
HGB BLD-MCNC: 14.2 G/DL (ref 12–16)
HGB UR QL STRIP: (no result)
INR BLD: 1.7 (ref 2–3.5)
KETONES UR QL STRIP: NEGATIVE
LEUKOCYTE ESTERASE UR QL STRIP: (no result)
LYMPHOCYTES # BLD AUTO: 1.8 10*3/UL (ref 1.3–4.4)
LYMPHOCYTES NFR BLD AUTO: 30.3 % (ref 27–41)
MCH RBC QN AUTO: 29.2 PG (ref 27–31)
MCHC RBC AUTO-ENTMCNC: 32.8 G/DL (ref 33–37)
MCV RBC AUTO: 89.1 FL (ref 81–99)
MONOCYTES # BLD AUTO: 0.5 10*3/UL (ref 0.1–1)
MONOCYTES NFR BLD MANUAL: 8.5 % (ref 3–9)
NEUT #: 3.4 10*3/UL (ref 2.3–7.9)
NEUT %: 58.1 % (ref 47–73)
NITRITE UR QL STRIP: POSITIVE
NRBC BLD QL AUTO: 0 10*3/UL (ref 0–0)
PH UR STRIP: 6 [PH] (ref 5–9)
PLATELET # BLD AUTO: 190 10*3/UL (ref 130–400)
PMV BLD AUTO: 9.8 FL (ref 9.6–12.3)
POTASSIUM SERPL-SCNC: 3.5 MMOL/L (ref 3.5–5.1)
PROT SERPL-MCNC: 6.9 GM/DL (ref 6.4–8.2)
RBC # BLD AUTO: 4.86 10*6/UL (ref 4.1–5.1)
RBC #/AREA URNS HPF: (no result) RBC/HPF (ref 0–2)
SODIUM SERPL-SCNC: 143 MMOL/L (ref 136–145)
SP GR UR: 1.02 (ref 1–1.03)
UROBILINOGEN UR STRIP-MCNC: 0.2 E.U./DL (ref 0.2–1)
WBC #/AREA URNS HPF: (no result) WBC/HPF (ref 0–5)
WBC NRBC COR # BLD AUTO: 5.8 10*3/UL (ref 4.8–10.8)

## 2019-09-11 ENCOUNTER — HOSPITAL ENCOUNTER (OUTPATIENT)
Dept: HOSPITAL 83 - CARD | Age: 79
Discharge: HOME | End: 2019-09-11
Attending: INTERNAL MEDICINE
Payer: MEDICARE

## 2019-09-11 DIAGNOSIS — R94.31: ICD-10-CM

## 2019-09-11 DIAGNOSIS — R53.81: Primary | ICD-10-CM

## 2019-09-17 ENCOUNTER — OFFICE VISIT (OUTPATIENT)
Dept: CARDIOLOGY CLINIC | Age: 79
End: 2019-09-17
Payer: MEDICARE

## 2019-09-17 VITALS
BODY MASS INDEX: 28.53 KG/M2 | WEIGHT: 161 LBS | SYSTOLIC BLOOD PRESSURE: 128 MMHG | HEIGHT: 63 IN | DIASTOLIC BLOOD PRESSURE: 84 MMHG | RESPIRATION RATE: 18 BRPM | HEART RATE: 65 BPM

## 2019-09-17 DIAGNOSIS — R94.39 ABNORMAL CARDIOVASCULAR STRESS TEST: ICD-10-CM

## 2019-09-17 DIAGNOSIS — I25.10 MILD CORONARY ARTERY DISEASE: ICD-10-CM

## 2019-09-17 DIAGNOSIS — I10 ESSENTIAL HYPERTENSION: ICD-10-CM

## 2019-09-17 DIAGNOSIS — Z01.810 PREOPERATIVE CARDIOVASCULAR EXAMINATION: ICD-10-CM

## 2019-09-17 DIAGNOSIS — I48.0 PAROXYSMAL ATRIAL FIBRILLATION (HCC): Primary | ICD-10-CM

## 2019-09-17 DIAGNOSIS — G89.29 CHRONIC PAIN OF RIGHT KNEE: ICD-10-CM

## 2019-09-17 DIAGNOSIS — M25.561 CHRONIC PAIN OF RIGHT KNEE: ICD-10-CM

## 2019-09-17 PROCEDURE — G8400 PT W/DXA NO RESULTS DOC: HCPCS | Performed by: INTERNAL MEDICINE

## 2019-09-17 PROCEDURE — 4040F PNEUMOC VAC/ADMIN/RCVD: CPT | Performed by: INTERNAL MEDICINE

## 2019-09-17 PROCEDURE — G8598 ASA/ANTIPLAT THER USED: HCPCS | Performed by: INTERNAL MEDICINE

## 2019-09-17 PROCEDURE — G8419 CALC BMI OUT NRM PARAM NOF/U: HCPCS | Performed by: INTERNAL MEDICINE

## 2019-09-17 PROCEDURE — G8427 DOCREV CUR MEDS BY ELIG CLIN: HCPCS | Performed by: INTERNAL MEDICINE

## 2019-09-17 PROCEDURE — 1090F PRES/ABSN URINE INCON ASSESS: CPT | Performed by: INTERNAL MEDICINE

## 2019-09-17 PROCEDURE — 1123F ACP DISCUSS/DSCN MKR DOCD: CPT | Performed by: INTERNAL MEDICINE

## 2019-09-17 PROCEDURE — 99214 OFFICE O/P EST MOD 30 MIN: CPT | Performed by: INTERNAL MEDICINE

## 2019-09-17 PROCEDURE — 93000 ELECTROCARDIOGRAM COMPLETE: CPT | Performed by: INTERNAL MEDICINE

## 2019-09-17 PROCEDURE — 1036F TOBACCO NON-USER: CPT | Performed by: INTERNAL MEDICINE

## 2019-09-17 RX ORDER — GABAPENTIN 300 MG/1
1 CAPSULE ORAL DAILY
Refills: 3 | COMMUNITY
Start: 2019-08-30

## 2019-09-17 RX ORDER — ASPIRIN 81 MG/1
81 TABLET ORAL DAILY
Qty: 90 TABLET | Refills: 1
Start: 2019-09-17 | End: 2020-01-15

## 2019-09-18 ENCOUNTER — HOSPITAL ENCOUNTER (OUTPATIENT)
Dept: HOSPITAL 83 - INJECTION | Age: 79
Discharge: HOME | End: 2019-09-18
Attending: ORTHOPAEDIC SURGERY
Payer: MEDICARE

## 2019-09-18 VITALS — SYSTOLIC BLOOD PRESSURE: 153 MMHG | DIASTOLIC BLOOD PRESSURE: 72 MMHG

## 2019-09-18 DIAGNOSIS — M81.0: Primary | ICD-10-CM

## 2019-09-18 DIAGNOSIS — Z87.891: ICD-10-CM

## 2019-09-18 DIAGNOSIS — K21.9: ICD-10-CM

## 2019-09-18 DIAGNOSIS — I10: ICD-10-CM

## 2019-09-18 DIAGNOSIS — E78.00: ICD-10-CM

## 2019-09-18 DIAGNOSIS — M48.00: ICD-10-CM

## 2019-09-18 DIAGNOSIS — I48.91: ICD-10-CM

## 2019-09-27 ENCOUNTER — HOSPITAL ENCOUNTER (OUTPATIENT)
Dept: CARDIAC CATH/INVASIVE PROCEDURES | Age: 79
Discharge: HOME | End: 2019-09-27
Payer: MEDICARE

## 2019-09-27 LAB
ABO/RH: NORMAL
ANION GAP SERPL CALCULATED.3IONS-SCNC: 5 MMOL/L (ref 7–16)
ANTIBODY SCREEN: NORMAL
BUN BLDV-MCNC: 11 MG/DL (ref 8–23)
CALCIUM SERPL-MCNC: 8.9 MG/DL (ref 8.6–10.2)
CHLORIDE BLD-SCNC: 106 MMOL/L (ref 98–107)
CO2: 30 MMOL/L (ref 22–29)
CREAT SERPL-MCNC: 0.5 MG/DL (ref 0.5–1)
GFR AFRICAN AMERICAN: >60
GFR NON-AFRICAN AMERICAN: >60 ML/MIN/1.73
GLUCOSE BLD-MCNC: 91 MG/DL (ref 74–99)
HCT VFR BLD CALC: 46 % (ref 34–48)
HEMOGLOBIN: 14.5 G/DL (ref 11.5–15.5)
INR BLD: 0.9
MCH RBC QN AUTO: 29 PG (ref 26–35)
MCHC RBC AUTO-ENTMCNC: 31.5 % (ref 32–34.5)
MCV RBC AUTO: 92 FL (ref 80–99.9)
PDW BLD-RTO: 13.9 FL (ref 11.5–15)
PLATELET # BLD: 166 E9/L (ref 130–450)
PMV BLD AUTO: 9.3 FL (ref 7–12)
POTASSIUM SERPL-SCNC: 3.9 MMOL/L (ref 3.5–5)
PROTHROMBIN TIME: 10.3 SEC (ref 9.3–12.4)
RBC # BLD: 5 E12/L (ref 3.5–5.5)
SODIUM BLD-SCNC: 141 MMOL/L (ref 132–146)
WBC # BLD: 5.1 E9/L (ref 4.5–11.5)

## 2019-09-27 PROCEDURE — 86900 BLOOD TYPING SEROLOGIC ABO: CPT

## 2019-09-27 PROCEDURE — 36415 COLL VENOUS BLD VENIPUNCTURE: CPT

## 2019-09-27 PROCEDURE — 86850 RBC ANTIBODY SCREEN: CPT

## 2019-09-27 PROCEDURE — 80048 BASIC METABOLIC PNL TOTAL CA: CPT

## 2019-09-27 PROCEDURE — 86901 BLOOD TYPING SEROLOGIC RH(D): CPT

## 2019-09-27 PROCEDURE — 2500000003 HC RX 250 WO HCPCS

## 2019-09-27 PROCEDURE — 93458 L HRT ARTERY/VENTRICLE ANGIO: CPT | Performed by: INTERNAL MEDICINE

## 2019-09-27 PROCEDURE — C1769 GUIDE WIRE: HCPCS

## 2019-09-27 PROCEDURE — C1894 INTRO/SHEATH, NON-LASER: HCPCS

## 2019-09-27 PROCEDURE — 85610 PROTHROMBIN TIME: CPT

## 2019-09-27 PROCEDURE — 2709999900 HC NON-CHARGEABLE SUPPLY

## 2019-09-27 PROCEDURE — 85027 COMPLETE CBC AUTOMATED: CPT

## 2019-09-27 PROCEDURE — 6360000002 HC RX W HCPCS

## 2019-09-27 NOTE — PROCEDURES
normal in size and it is  hyperdynamic in contractility with an estimated ejection fraction of  75%. There was no mitral regurgitation. The right radial arterial sheath was removed at the end of the  procedure, and a TR band was applied with adequate hemostasis and with  preservation of pulse. The patient tolerated the procedure well and left the cardiac  catheterization laboratory in a stable condition. CONCLUSION:  1. Mild-to-moderate coronary artery disease involving the diagonal  branch of the LAD and the proximal RCA with patent left main, LAD, and  left circumflex. 2.  Normal left ventricular size with hyperdynamic left ventricular  systolic function.         Ramon Salazar MD    D: 09/27/2019 13:02:13       T: 09/27/2019 13:42:17     [id number]  Job#: [id number]     Doc#: [id number]    CC:

## 2019-10-01 ENCOUNTER — TELEPHONE (OUTPATIENT)
Dept: CARDIOLOGY CLINIC | Age: 79
End: 2019-10-01

## 2019-10-22 ENCOUNTER — HOSPITAL ENCOUNTER (OUTPATIENT)
Age: 79
Discharge: HOME | End: 2019-10-24

## 2019-10-22 LAB
ABO/RH: NORMAL
AMORPHOUS: NORMAL
ANTIBODY SCREEN: NORMAL
BACTERIA: NORMAL /HPF
BILIRUBIN URINE: NEGATIVE
BLOOD, URINE: NEGATIVE
CLARITY: CLEAR
COLOR: YELLOW
GLUCOSE URINE: NEGATIVE MG/DL
KETONES, URINE: ABNORMAL MG/DL
LEUKOCYTE ESTERASE, URINE: ABNORMAL
NITRITE, URINE: NEGATIVE
PH UA: 7 (ref 5–9)
PROTEIN UA: NEGATIVE MG/DL
RBC UA: NORMAL /HPF (ref 0–2)
SPECIFIC GRAVITY UA: 1.01 (ref 1–1.03)
UROBILINOGEN, URINE: 0.2 E.U./DL
WBC UA: NORMAL /HPF (ref 0–5)

## 2019-10-22 PROCEDURE — 87081 CULTURE SCREEN ONLY: CPT

## 2019-10-22 PROCEDURE — 87088 URINE BACTERIA CULTURE: CPT

## 2019-10-22 PROCEDURE — 86900 BLOOD TYPING SEROLOGIC ABO: CPT

## 2019-10-22 PROCEDURE — 81001 URINALYSIS AUTO W/SCOPE: CPT

## 2019-10-22 PROCEDURE — 86901 BLOOD TYPING SEROLOGIC RH(D): CPT

## 2019-10-22 PROCEDURE — 86850 RBC ANTIBODY SCREEN: CPT

## 2019-10-24 LAB
MRSA CULTURE ONLY: NORMAL
URINE CULTURE, ROUTINE: NORMAL

## 2019-10-30 ENCOUNTER — HOSPITAL ENCOUNTER (OUTPATIENT)
Age: 79
Discharge: HOME | End: 2019-11-01

## 2019-10-30 LAB
ANION GAP SERPL CALCULATED.3IONS-SCNC: 14 MMOL/L (ref 7–16)
BUN BLDV-MCNC: 6 MG/DL (ref 8–23)
CALCIUM SERPL-MCNC: 7.3 MG/DL (ref 8.6–10.2)
CHLORIDE BLD-SCNC: 101 MMOL/L (ref 98–107)
CO2: 24 MMOL/L (ref 22–29)
CREAT SERPL-MCNC: 0.4 MG/DL (ref 0.5–1)
GFR AFRICAN AMERICAN: >60
GFR NON-AFRICAN AMERICAN: >60 ML/MIN/1.73
GLUCOSE BLD-MCNC: 132 MG/DL (ref 74–99)
HCT VFR BLD CALC: 37.1 % (ref 34–48)
HEMOGLOBIN: 11.8 G/DL (ref 11.5–15.5)
INR BLD: 1
MCH RBC QN AUTO: 29.6 PG (ref 26–35)
MCHC RBC AUTO-ENTMCNC: 31.8 % (ref 32–34.5)
MCV RBC AUTO: 93.2 FL (ref 80–99.9)
PDW BLD-RTO: 13.5 FL (ref 11.5–15)
PLATELET # BLD: 148 E9/L (ref 130–450)
PMV BLD AUTO: 10.4 FL (ref 7–12)
POTASSIUM SERPL-SCNC: 3.4 MMOL/L (ref 3.5–5)
PROTHROMBIN TIME: 10.9 SEC (ref 9.3–12.4)
RBC # BLD: 3.98 E12/L (ref 3.5–5.5)
SODIUM BLD-SCNC: 139 MMOL/L (ref 132–146)
WBC # BLD: 8.9 E9/L (ref 4.5–11.5)

## 2019-10-30 PROCEDURE — 80048 BASIC METABOLIC PNL TOTAL CA: CPT

## 2019-10-30 PROCEDURE — 85610 PROTHROMBIN TIME: CPT

## 2019-10-30 PROCEDURE — 85027 COMPLETE CBC AUTOMATED: CPT

## 2019-10-31 ENCOUNTER — HOSPITAL ENCOUNTER (OUTPATIENT)
Age: 79
Discharge: HOME | End: 2019-11-02

## 2019-10-31 LAB
ANION GAP SERPL CALCULATED.3IONS-SCNC: 12 MMOL/L (ref 7–16)
BUN BLDV-MCNC: 12 MG/DL (ref 8–23)
CALCIUM SERPL-MCNC: 6.8 MG/DL (ref 8.6–10.2)
CHLORIDE BLD-SCNC: 104 MMOL/L (ref 98–107)
CO2: 21 MMOL/L (ref 22–29)
CREAT SERPL-MCNC: 0.6 MG/DL (ref 0.5–1)
GFR AFRICAN AMERICAN: >60
GFR NON-AFRICAN AMERICAN: >60 ML/MIN/1.73
GLUCOSE BLD-MCNC: 97 MG/DL (ref 74–99)
HCT VFR BLD CALC: 31.7 % (ref 34–48)
HEMOGLOBIN: 10.1 G/DL (ref 11.5–15.5)
INR BLD: 1.2
MCH RBC QN AUTO: 29.8 PG (ref 26–35)
MCHC RBC AUTO-ENTMCNC: 31.9 % (ref 32–34.5)
MCV RBC AUTO: 93.5 FL (ref 80–99.9)
PDW BLD-RTO: 13.9 FL (ref 11.5–15)
PLATELET # BLD: 144 E9/L (ref 130–450)
PMV BLD AUTO: 10.1 FL (ref 7–12)
POTASSIUM SERPL-SCNC: 3.5 MMOL/L (ref 3.5–5)
PROTHROMBIN TIME: 14 SEC (ref 9.3–12.4)
RBC # BLD: 3.39 E12/L (ref 3.5–5.5)
SODIUM BLD-SCNC: 137 MMOL/L (ref 132–146)
WBC # BLD: 8.5 E9/L (ref 4.5–11.5)

## 2019-10-31 PROCEDURE — 80048 BASIC METABOLIC PNL TOTAL CA: CPT

## 2019-10-31 PROCEDURE — 85027 COMPLETE CBC AUTOMATED: CPT

## 2019-10-31 PROCEDURE — 85610 PROTHROMBIN TIME: CPT

## 2019-11-01 ENCOUNTER — HOSPITAL ENCOUNTER (OUTPATIENT)
Age: 79
Discharge: HOME | End: 2019-11-03

## 2019-11-01 LAB
ANION GAP SERPL CALCULATED.3IONS-SCNC: 9 MMOL/L (ref 7–16)
BUN BLDV-MCNC: 16 MG/DL (ref 8–23)
CALCIUM SERPL-MCNC: 7.2 MG/DL (ref 8.6–10.2)
CHLORIDE BLD-SCNC: 104 MMOL/L (ref 98–107)
CO2: 24 MMOL/L (ref 22–29)
CREAT SERPL-MCNC: 0.7 MG/DL (ref 0.5–1)
GFR AFRICAN AMERICAN: >60
GFR NON-AFRICAN AMERICAN: >60 ML/MIN/1.73
GLUCOSE BLD-MCNC: 98 MG/DL (ref 74–99)
HCT VFR BLD CALC: 29.7 % (ref 34–48)
HEMOGLOBIN: 9.4 G/DL (ref 11.5–15.5)
INR BLD: 1.3
MCH RBC QN AUTO: 29.7 PG (ref 26–35)
MCHC RBC AUTO-ENTMCNC: 31.6 % (ref 32–34.5)
MCV RBC AUTO: 94 FL (ref 80–99.9)
PDW BLD-RTO: 14.1 FL (ref 11.5–15)
PLATELET # BLD: 159 E9/L (ref 130–450)
PMV BLD AUTO: 10.2 FL (ref 7–12)
POTASSIUM SERPL-SCNC: 3.6 MMOL/L (ref 3.5–5)
PROTHROMBIN TIME: 15.3 SEC (ref 9.3–12.4)
RBC # BLD: 3.16 E12/L (ref 3.5–5.5)
SODIUM BLD-SCNC: 137 MMOL/L (ref 132–146)
WBC # BLD: 7.5 E9/L (ref 4.5–11.5)

## 2019-11-01 PROCEDURE — 80048 BASIC METABOLIC PNL TOTAL CA: CPT

## 2019-11-01 PROCEDURE — 85610 PROTHROMBIN TIME: CPT

## 2019-11-01 PROCEDURE — 85027 COMPLETE CBC AUTOMATED: CPT

## 2019-11-20 LAB
INR BLD: 2.3
PROTIME: 23.9 SECONDS

## 2019-11-21 DIAGNOSIS — I48.91 ATRIAL FIBRILLATION, UNSPECIFIED TYPE (HCC): ICD-10-CM

## 2020-01-07 LAB
INR BLD: 2.2
PROTIME: 23 SECONDS

## 2020-01-15 ENCOUNTER — OFFICE VISIT (OUTPATIENT)
Dept: CARDIOLOGY CLINIC | Age: 80
End: 2020-01-15
Payer: MEDICARE

## 2020-01-15 VITALS
SYSTOLIC BLOOD PRESSURE: 130 MMHG | HEIGHT: 61 IN | DIASTOLIC BLOOD PRESSURE: 78 MMHG | BODY MASS INDEX: 28.7 KG/M2 | HEART RATE: 62 BPM | RESPIRATION RATE: 18 BRPM | WEIGHT: 152 LBS

## 2020-01-15 PROCEDURE — G8427 DOCREV CUR MEDS BY ELIG CLIN: HCPCS | Performed by: INTERNAL MEDICINE

## 2020-01-15 PROCEDURE — 4040F PNEUMOC VAC/ADMIN/RCVD: CPT | Performed by: INTERNAL MEDICINE

## 2020-01-15 PROCEDURE — 99214 OFFICE O/P EST MOD 30 MIN: CPT | Performed by: INTERNAL MEDICINE

## 2020-01-15 PROCEDURE — G8400 PT W/DXA NO RESULTS DOC: HCPCS | Performed by: INTERNAL MEDICINE

## 2020-01-15 PROCEDURE — 93000 ELECTROCARDIOGRAM COMPLETE: CPT | Performed by: INTERNAL MEDICINE

## 2020-01-15 PROCEDURE — 1123F ACP DISCUSS/DSCN MKR DOCD: CPT | Performed by: INTERNAL MEDICINE

## 2020-01-15 PROCEDURE — 1036F TOBACCO NON-USER: CPT | Performed by: INTERNAL MEDICINE

## 2020-01-15 PROCEDURE — 1090F PRES/ABSN URINE INCON ASSESS: CPT | Performed by: INTERNAL MEDICINE

## 2020-01-15 PROCEDURE — G8484 FLU IMMUNIZE NO ADMIN: HCPCS | Performed by: INTERNAL MEDICINE

## 2020-01-15 PROCEDURE — G8417 CALC BMI ABV UP PARAM F/U: HCPCS | Performed by: INTERNAL MEDICINE

## 2020-01-15 RX ORDER — WARFARIN SODIUM 5 MG/1
5 TABLET ORAL DAILY
Qty: 90 TABLET | Refills: 3 | Status: SHIPPED
Start: 2020-01-15 | End: 2020-04-09 | Stop reason: SDUPTHER

## 2020-01-15 RX ORDER — WARFARIN SODIUM 7.5 MG/1
TABLET ORAL
Refills: 0 | COMMUNITY
Start: 2019-11-08 | End: 2020-01-15

## 2020-01-15 RX ORDER — ONDANSETRON 8 MG/1
TABLET, ORALLY DISINTEGRATING ORAL
COMMUNITY
Start: 2019-12-10 | End: 2020-01-15

## 2020-01-15 RX ORDER — WARFARIN SODIUM 5 MG/1
5 TABLET ORAL
Refills: 99 | COMMUNITY
Start: 2019-11-13 | End: 2020-01-15

## 2020-01-15 NOTE — PROGRESS NOTES
education level: Not on file   Occupational History    Not on file   Social Needs    Financial resource strain: Not on file    Food insecurity:     Worry: Not on file     Inability: Not on file    Transportation needs:     Medical: Not on file     Non-medical: Not on file   Tobacco Use    Smoking status: Former Smoker    Smokeless tobacco: Never Used   Substance and Sexual Activity    Alcohol use: Yes     Comment: socially    Drug use: No    Sexual activity: Not on file   Lifestyle    Physical activity:     Days per week: Not on file     Minutes per session: Not on file    Stress: Not on file   Relationships    Social connections:     Talks on phone: Not on file     Gets together: Not on file     Attends Confucianism service: Not on file     Active member of club or organization: Not on file     Attends meetings of clubs or organizations: Not on file     Relationship status: Not on file    Intimate partner violence:     Fear of current or ex partner: Not on file     Emotionally abused: Not on file     Physically abused: Not on file     Forced sexual activity: Not on file   Other Topics Concern    Not on file   Social History Narrative    Not on file       History reviewed. No pertinent family history. Review of Systems:  Heart: as above   Lungs: as above   Eyes: denies changes in vision or discharge. Ears: denies changes in hearing or pain. Nose: denies epistaxis or masses   Throat: denies sore throat or trouble swallowing. Neuro: denies numbness, tingling, tremors. Skin: denies rashes or itching. : denies hematuria, dysuria   GI: denies vomiting, diarrhea   Psych: denies mood changed, anxiety, depression. All other systems negative. Physical Exam   /78   Pulse 62   Resp 18   Ht 5' 1\" (1.549 m)   Wt 152 lb (68.9 kg)   BMI 28.72 kg/m²   Constitutional: Oriented to person, place, and time. Well-developed and well-nourished. No distress. Head: Normocephalic and atraumatic. Eyes: EOM are normal. Pupils are equal, round, and reactive to light. Neck: Normal range of motion. Neck supple. No hepatojugular reflux and no JVD present. Carotid bruit is not present. No tracheal deviation present. No thyromegaly present. Cardiovascular: Normal rate, regular rhythm, normal heart sounds and intact distal pulses. Exam reveals no gallop and no friction rub. No murmur heard. Pulmonary/Chest: Effort normal and breath sounds normal. No respiratory distress. No wheezes. No rales. No tenderness. Abdominal: Soft. Bowel sounds are normal. No distension and no mass. No tenderness. No rebound and no guarding. Musculoskeletal: Normal range of motion. No edema and no tenderness. Lymphadenopathy:   No cervical adenopathy. No groin adenopathy. Neurological: Alert and oriented to person, place, and time. Skin: Skin is warm and dry. No rash noted. Not diaphoretic. No erythema. Psychiatric: Normal mood and affect. Behavior is normal.     EKG:  normal EKG, normal sinus rhythm. ASSESSMENT AND PLAN:  Patient Active Problem List   Diagnosis    Atrial fibrillation ([address]     1. Afib: In sinus. Normal echo 1/9/19. BB and warfarin. 2. CAD: Moderate CAD by 9/19 cath. Medically manage. 3. HTN: Observe. 4. Peripheral neuropathy.     Eufemia Leblanc D.O.   Cardiologist  Cardiology, [facility]

## 2020-02-12 LAB
INR BLD: 1.4
PROTIME: 15.3 SECONDS

## 2020-03-11 LAB
INR BLD: 1.3
PROTIME: 14 SECONDS

## 2020-03-13 ENCOUNTER — TELEPHONE (OUTPATIENT)
Dept: CARDIOLOGY CLINIC | Age: 80
End: 2020-03-13

## 2020-03-19 NOTE — TELEPHONE ENCOUNTER
Randy Awad called she does not want to come in hospital to get INR done. Can she just keep taking the increased dose you said? Please advise.  Thank you

## 2020-03-19 NOTE — TELEPHONE ENCOUNTER
Will need an INR at least next week to make sure warfarin isnt at dangerous level. Sharona Luong D.O.   Cardiologist  Cardiology, [facility]

## 2020-03-23 LAB
INR BLD: 2.2
PROTIME: 22.9 SECONDS

## 2020-03-25 ENCOUNTER — TELEPHONE (OUTPATIENT)
Dept: CARDIOLOGY CLINIC | Age: 80
End: 2020-03-25

## 2020-03-25 NOTE — TELEPHONE ENCOUNTER
Per Dr. Stephan Corona, he reviewed INR, same dosage, recheck in one week  Called pt and informed her and she agreed.

## 2020-04-08 LAB
INR BLD: 1.9
PROTIME: 20.2 SECONDS

## 2020-04-09 RX ORDER — WARFARIN SODIUM 5 MG/1
5 TABLET ORAL
COMMUNITY
End: 2020-07-06 | Stop reason: SDUPTHER

## 2020-04-09 RX ORDER — WARFARIN SODIUM 5 MG/1
5 TABLET ORAL DAILY
Qty: 90 TABLET | Refills: 3 | Status: SHIPPED
Start: 2020-04-09 | End: 2020-11-05 | Stop reason: ALTCHOICE

## 2020-04-15 LAB
INR BLD: 2.7
PROTIME: 27.8 SECONDS

## 2020-04-17 ENCOUNTER — TELEPHONE (OUTPATIENT)
Dept: CARDIOLOGY CLINIC | Age: 80
End: 2020-04-17

## 2020-04-17 NOTE — TELEPHONE ENCOUNTER
Patients INR is 2.7, she called and would like to know dosing that she should continue and when to repeat labs

## 2020-04-17 NOTE — TELEPHONE ENCOUNTER
Same dose recheck 1 wk please. Donald Toledo D.O.   Cardiologist  Cardiology, [address]

## 2020-04-21 ENCOUNTER — TELEPHONE (OUTPATIENT)
Dept: CARDIOLOGY CLINIC | Age: 80
End: 2020-04-21

## 2020-04-23 LAB
INR BLD: 3.2
PROTIME: 32.3 SECONDS

## 2020-04-28 ENCOUNTER — TELEPHONE (OUTPATIENT)
Dept: CARDIOLOGY CLINIC | Age: 80
End: 2020-04-28

## 2020-04-28 NOTE — TELEPHONE ENCOUNTER
Actually alternate doses of 5 and 7.5 mg. Recheck 1 wk. Tilda Buerger, D.O.   Cardiologist  Cardiology, [facility]

## 2020-04-28 NOTE — TELEPHONE ENCOUNTER
----- Message from Shayne Young DO sent at 4/28/2020  9:09 AM EDT -----  Same doses, recheck 1 wk. Shayne Young D.O.   Cardiologist  Cardiology, [facility]

## 2020-05-04 PROBLEM — Z87.891 HISTORY OF TOBACCO USE: Status: ACTIVE | Noted: 2020-05-04

## 2020-05-04 PROBLEM — M14.60 ARTHROPATHY ASSOCIATED WITH NEUROLOGICAL DISORDER: Status: ACTIVE | Noted: 2020-05-04

## 2020-05-04 PROBLEM — I10 ESSENTIAL HYPERTENSION: Status: ACTIVE | Noted: 2020-05-04

## 2020-05-04 PROBLEM — G62.9 POLYNEUROPATHY: Status: ACTIVE | Noted: 2020-05-04

## 2020-05-04 PROBLEM — M19.91 PRIMARY OSTEOARTHRITIS: Status: ACTIVE | Noted: 2020-05-04

## 2020-05-04 PROBLEM — Z79.01 LONG TERM CURRENT USE OF ANTICOAGULANT THERAPY: Status: ACTIVE | Noted: 2020-05-04

## 2020-05-04 PROBLEM — Z96.659 ARTIFICIAL KNEE JOINT PRESENT: Status: ACTIVE | Noted: 2020-05-04

## 2020-05-05 ENCOUNTER — VIRTUAL VISIT (OUTPATIENT)
Dept: CARDIOLOGY CLINIC | Age: 80
End: 2020-05-05
Payer: MEDICARE

## 2020-05-05 VITALS
HEIGHT: 61 IN | HEART RATE: 60 BPM | WEIGHT: 151 LBS | DIASTOLIC BLOOD PRESSURE: 77 MMHG | BODY MASS INDEX: 28.51 KG/M2 | SYSTOLIC BLOOD PRESSURE: 137 MMHG

## 2020-05-05 PROCEDURE — 99442 PR PHYS/QHP TELEPHONE EVALUATION 11-20 MIN: CPT | Performed by: INTERNAL MEDICINE

## 2020-05-05 RX ORDER — ISOSORBIDE MONONITRATE 30 MG/1
30 TABLET, EXTENDED RELEASE ORAL DAILY
Qty: 90 TABLET | Refills: 3 | Status: SHIPPED
Start: 2020-05-05 | End: 2020-05-05 | Stop reason: SDUPTHER

## 2020-05-05 RX ORDER — ISOSORBIDE MONONITRATE 30 MG/1
30 TABLET, EXTENDED RELEASE ORAL DAILY
Qty: 90 TABLET | Refills: 3 | Status: SHIPPED
Start: 2020-05-05 | End: 2020-11-05

## 2020-05-05 NOTE — PROGRESS NOTES
for this visit. Social History     Socioeconomic History    Marital status: Single     Spouse name: Not on file    Number of children: Not on file    Years of education: Not on file    Highest education level: Not on file   Occupational History    Not on file   Social Needs    Financial resource strain: Not on file    Food insecurity     Worry: Not on file     Inability: Not on file    Transportation needs     Medical: Not on file     Non-medical: Not on file   Tobacco Use    Smoking status: Former Smoker    Smokeless tobacco: Never Used   Substance and Sexual Activity    Alcohol use: Yes     Alcohol/week: 1.0 standard drinks     Types: 1 Glasses of wine per week     Comment: socially    Drug use: No    Sexual activity: Not on file   Lifestyle    Physical activity     Days per week: Not on file     Minutes per session: Not on file    Stress: Not on file   Relationships    Social connections     Talks on phone: Not on file     Gets together: Not on file     Attends Taoist service: Not on file     Active member of club or organization: Not on file     Attends meetings of clubs or organizations: Not on file     Relationship status: Not on file    Intimate partner violence     Fear of current or ex partner: Not on file     Emotionally abused: Not on file     Physically abused: Not on file     Forced sexual activity: Not on file   Other Topics Concern    Not on file   Social History Narrative    Not on file       History reviewed. No pertinent family history. Review of Systems:  Heart: as above   Lungs: as above   Eyes: denies changes in vision or discharge. Ears: denies changes in hearing or pain. Nose: denies epistaxis or masses   Throat: denies sore throat or trouble swallowing. Neuro: denies numbness, tingling, tremors. Skin: denies rashes or itching. : denies hematuria, dysuria   GI: denies vomiting, diarrhea   Psych: denies mood changed, anxiety, depression.   All other

## 2020-05-06 ENCOUNTER — TELEPHONE (OUTPATIENT)
Dept: CARDIOLOGY CLINIC | Age: 80
End: 2020-05-06

## 2020-05-06 LAB
INR BLD: 2.3
PROTIME: 24.1 SECONDS

## 2020-05-07 ENCOUNTER — TELEPHONE (OUTPATIENT)
Dept: CARDIOLOGY CLINIC | Age: 80
End: 2020-05-07

## 2020-05-07 NOTE — TELEPHONE ENCOUNTER
----- Message from Barby Dugan DO sent at 5/7/2020  8:59 AM EDT -----  INR perfect. Continue same dose schedule and recheck in 2 wks please. Barby Dugan D.O.   Cardiologist  Cardiology, [facility]

## 2020-05-20 LAB
INR BLD: 1.8
PROTIME: 18.5 SECONDS

## 2020-05-22 ENCOUNTER — TELEPHONE (OUTPATIENT)
Dept: CARDIOLOGY CLINIC | Age: 80
End: 2020-05-22

## 2020-05-22 NOTE — TELEPHONE ENCOUNTER
Take 7.5 M-W-F (including today) recheck 1 wk. Faraz Allen D.O.   Cardiologist  Cardiology, [facility]

## 2020-06-03 LAB
INR BLD: 2.4
PROTIME: 24.6 SECONDS

## 2020-06-05 ENCOUNTER — TELEPHONE (OUTPATIENT)
Dept: CARDIOLOGY CLINIC | Age: 80
End: 2020-06-05

## 2020-06-24 LAB
INR BLD: 1.8
PROTIME: 18.5 SECONDS

## 2020-06-26 NOTE — PROGRESS NOTES
Called the pt. Per , Warfarin 7.5 mg on M W F & Sat.  5mg on Tues Thursday and Sunday. Informed her to recheck on week and the patient ageed.

## 2020-07-06 NOTE — TELEPHONE ENCOUNTER
Pt calls, stated that on Virtual Visit of 5/5/2020 you stated that you would send a script for Warfarin 1 mg. To her pharmacy, she stated this would be easier for her to adjust her dosage.  She stated that this was not sent

## 2020-07-07 RX ORDER — WARFARIN SODIUM 1 MG/1
1 TABLET ORAL DAILY
Qty: 30 TABLET | Refills: 1 | Status: SHIPPED
Start: 2020-07-07 | End: 2020-11-05 | Stop reason: ALTCHOICE

## 2020-07-13 LAB
INR BLD: 2
PROTIME: 21.3 SECONDS

## 2020-07-14 ENCOUNTER — ANTI-COAG VISIT (OUTPATIENT)
Dept: CARDIOLOGY CLINIC | Age: 80
End: 2020-07-14

## 2020-07-14 ENCOUNTER — TELEPHONE (OUTPATIENT)
Dept: CARDIOLOGY CLINIC | Age: 80
End: 2020-07-14

## 2020-07-14 NOTE — PROGRESS NOTES
Per  pt. Is to continue same dose and recheck in 1 month. Pt is taking 7.5mg mon,wed,fri,sun and 5mg all others. pt . Understood instructions.

## 2020-07-20 ENCOUNTER — HOSPITAL ENCOUNTER (OUTPATIENT)
Dept: HOSPITAL 83 - INJECTION | Age: 80
Discharge: HOME | End: 2020-07-20
Attending: INTERNAL MEDICINE
Payer: MEDICARE

## 2020-07-20 VITALS — DIASTOLIC BLOOD PRESSURE: 71 MMHG | SYSTOLIC BLOOD PRESSURE: 134 MMHG

## 2020-07-20 DIAGNOSIS — M81.0: Primary | ICD-10-CM

## 2020-08-04 LAB
INR BLD: 1.9
PROTIME: 19.8 SECONDS

## 2020-08-07 ENCOUNTER — HOSPITAL ENCOUNTER (OUTPATIENT)
Dept: HOSPITAL 83 - COVID19 | Age: 80
Discharge: HOME | End: 2020-08-07
Attending: INTERNAL MEDICINE
Payer: MEDICARE

## 2020-08-07 DIAGNOSIS — R06.02: Primary | ICD-10-CM

## 2020-08-07 DIAGNOSIS — Z20.828: ICD-10-CM

## 2020-09-04 LAB
INR BLD: 2.4
PROTIME: 24.5 SECONDS

## 2020-09-08 ENCOUNTER — TELEPHONE (OUTPATIENT)
Dept: CARDIOLOGY CLINIC | Age: 80
End: 2020-09-08

## 2020-09-08 NOTE — TELEPHONE ENCOUNTER
----- Message from Deyanira Malik DO sent at 9/8/2020 10:53 AM EDT -----  Same doses. Repeat 4 wks. Deyanira Malik D.O.   Cardiologist  Cardiology, [facility]  ----- Message -----  From: Vikash Hsieh MA  Sent: 9/8/2020   9:39 AM EDT  To: Deyanira Malik DO

## 2020-10-12 LAB
INR BLD: 1.5
PROTIME: 15.1 SECONDS

## 2020-10-16 ENCOUNTER — TELEPHONE (OUTPATIENT)
Dept: CARDIOLOGY CLINIC | Age: 80
End: 2020-10-16

## 2020-10-16 NOTE — TELEPHONE ENCOUNTER
----- Message from Yaa Gonzalez DO sent at 10/16/2020  9:42 AM EDT -----  Increase warfarin to 7.5 mg M-W-F including today and recheck in 1 wk. Yaa Gonzalez D.O.   Cardiologist  Cardiology, [facility]

## 2020-10-27 LAB
INR BLD: 1.8
PROTIME: 18.5 SECONDS

## 2020-10-30 ENCOUNTER — TELEPHONE (OUTPATIENT)
Dept: CARDIOLOGY CLINIC | Age: 80
End: 2020-10-30

## 2020-10-30 NOTE — TELEPHONE ENCOUNTER
Please Review        Wilfredo called saw her PCP she has red dots all over her legs the right one being worse with some clusters, he PCP said to call you right away they feel it could be bleeding under the skin from the warfarin. Please advise , I did tell her I could get her in with Nisha Heaton 11/19 but if she needs seen sooner she may have to go to [place].

## 2020-11-02 NOTE — TELEPHONE ENCOUNTER
Her warfarin if anything is sub-therapeutic. She needs to continue and have an OV. Dipti Nicole D.O.   Cardiologist  Cardiology, [facility]

## 2020-11-04 PROBLEM — G47.9 SLEEP DISORDER: Status: ACTIVE | Noted: 2020-11-04

## 2020-11-04 PROBLEM — G62.9 NEUROPATHY: Status: ACTIVE | Noted: 2020-11-04

## 2020-11-04 PROBLEM — H40.9 GLAUCOMA: Status: ACTIVE | Noted: 2020-11-04

## 2020-11-05 ENCOUNTER — OFFICE VISIT (OUTPATIENT)
Dept: CARDIOLOGY CLINIC | Age: 80
End: 2020-11-05
Payer: MEDICARE

## 2020-11-05 VITALS
RESPIRATION RATE: 18 BRPM | BODY MASS INDEX: 28.89 KG/M2 | WEIGHT: 153 LBS | DIASTOLIC BLOOD PRESSURE: 68 MMHG | SYSTOLIC BLOOD PRESSURE: 126 MMHG | HEART RATE: 61 BPM | HEIGHT: 61 IN

## 2020-11-05 PROCEDURE — 1123F ACP DISCUSS/DSCN MKR DOCD: CPT | Performed by: INTERNAL MEDICINE

## 2020-11-05 PROCEDURE — G8400 PT W/DXA NO RESULTS DOC: HCPCS | Performed by: INTERNAL MEDICINE

## 2020-11-05 PROCEDURE — 1090F PRES/ABSN URINE INCON ASSESS: CPT | Performed by: INTERNAL MEDICINE

## 2020-11-05 PROCEDURE — G8484 FLU IMMUNIZE NO ADMIN: HCPCS | Performed by: INTERNAL MEDICINE

## 2020-11-05 PROCEDURE — 99214 OFFICE O/P EST MOD 30 MIN: CPT | Performed by: INTERNAL MEDICINE

## 2020-11-05 PROCEDURE — G8427 DOCREV CUR MEDS BY ELIG CLIN: HCPCS | Performed by: INTERNAL MEDICINE

## 2020-11-05 PROCEDURE — 4040F PNEUMOC VAC/ADMIN/RCVD: CPT | Performed by: INTERNAL MEDICINE

## 2020-11-05 PROCEDURE — 1036F TOBACCO NON-USER: CPT | Performed by: INTERNAL MEDICINE

## 2020-11-05 PROCEDURE — G8417 CALC BMI ABV UP PARAM F/U: HCPCS | Performed by: INTERNAL MEDICINE

## 2020-11-05 PROCEDURE — 93000 ELECTROCARDIOGRAM COMPLETE: CPT | Performed by: INTERNAL MEDICINE

## 2020-11-05 RX ORDER — CHOLECALCIFEROL (VITAMIN D3) 125 MCG
CAPSULE ORAL
COMMUNITY

## 2020-11-05 RX ORDER — ALPRAZOLAM 0.25 MG/1
TABLET ORAL PRN
COMMUNITY
End: 2021-09-29

## 2020-11-05 RX ORDER — CALCIUM POLYCARBOPHIL 625 MG
TABLET ORAL
COMMUNITY

## 2020-11-05 NOTE — PATIENT INSTRUCTIONS
Have PT/INR on Monday  Will call with result - When INR <2 start Xarelto ONCE daily  Call if more heart racing/skipping

## 2020-11-05 NOTE — PROGRESS NOTES
 Atrial fibrillation (HCC)     HTN (hypertension)             Past Surgical History:   Procedure Laterality Date    CARDIAC CATHETERIZATION  09/27/2019    Dr. Pal Burgos Left Heart Catheterization        History reviewed. No pertinent family history. Social History     Tobacco Use    Smoking status: Former Smoker    Smokeless tobacco: Never Used   Substance Use Topics    Alcohol use: Yes     Alcohol/week: 1.0 standard drinks     Types: 1 Glasses of wine per week     Comment: socially         Review of Systems:  Constitutional: negative for fever and chills, or significant weight loss  HEENT: negative for acute visual symptoms or auditory problems, no dysphagia  Respiratory: negative for cough, wheezing, or hemoptysis  Cardiovascular: negative for chest pain, +ve palpitations, and dyspnea  Gastrointestinal: negative for abdominal pain, diarrhea, nausea and vomiting  Endocrine: Negative for polyuria and polydyspsia  Genitourinary:negative for dysuria and hematuria  Derm: negative for rash and skin lesion(s)  Neurological: negative for tingling, numbness, weakness, seizures and tremors  Endocrine: negative for polydipsia and polyuria  Musculoskeletal: negative for pain or tenderness  Psychiatric: negative for anxiety, depression, or suicidal ideations         O:  COMPLETE PHYSICAL EXAM:       /68   Pulse 61   Resp 18   Ht 5' 1\" (1.549 m)   Wt 153 lb (69.4 kg)   BMI 28.91 kg/m²       General:   Patient alert, comfortable, no distress. Appears stated age. HEENT:    Pupils equal, no icterus, no nasal drainage, tongue moist.   Neck:              No masses, Thyroid not palpable. No elevated JVD, No carotid bruit. Chest:   Normal configuration, non tender. Lungs:   Clear to auscultation bilaterally, few scattered rhonchi.    Cardiovascular:  Regular rhythm, 1/6 systolic murmur, No S3, no palpable thrills,    Abdomen:  Soft, Non tender, Bowel sounds normal, no pulsatile abdominal aorta   Extremities:  Trace edema, + spider veins with mild fine erythematous rash; Distal pulses palpable. No cyanosis, no clubbing. Skin:   Good turgor, warm and dry, no cyanosis. Musculoskeletal: No joint swelling or deformity. Neuro:   Cranial nerves grossly intact; No focal neurologic deficit. Psych:   Alert, good mood and effect. REVIEW OF DIAGNOSTIC TESTS:        Electrocardiogram: NSR   Cath - 9/2019 noted - 50% of diagonal branch of the LAD and 30% proximal RCA; LV EF normal.        A/P:   ASSESSMENT / PLAN:    Gaudencio Petersen was seen today for follow-up. Diagnoses and all orders for this visit:    Paroxysmal A-fib ([address] - Agreeable to start [facility] - Renal Fn Ok last year - Get her recent labs from Dr Annita Braun. Start Xarelto  -     EKG 12 lead    Essential hypertension - Controlled  -     EKG 12 lead    History of Heart palpitations - Better now    Mild coronary artery disease - On BB, No ASA due to [address]; she declined Statin     Preventive Cardiology: Low cholesterol diet, regular exercise as tolerate, and gradual weight loss discussed. Other orders  -     rivaroxaban (XARELTO) 20 MG TABS tablet; Take 1 tablet by mouth daily (with breakfast)  -     rivaroxaban (XARELTO) 20 MG TABS tablet; Take 1 tablet by mouth daily (with breakfast)     Monitor BP and heart rates. Above recommendations discussed with her. All questions answered about cardiac diagnoses and cardiac medications. Continue current medications. Compliance with medications and f/u with all physicians discussed. Risk factor modification based on risk profile discussed. Call if any exertional chest pain, short of breath, dizzy or palpitations   Follow up in 9 months or earlier if needed.          [address] [facility] Cardiology  [address] [facility], [place], [address] [facility]  [phone]

## 2020-12-04 ENCOUNTER — HOSPITAL ENCOUNTER (OUTPATIENT)
Dept: HOSPITAL 83 - RESCLI | Age: 80
Discharge: HOME | End: 2020-12-04
Attending: INTERNAL MEDICINE
Payer: MEDICARE

## 2020-12-04 DIAGNOSIS — M81.0: ICD-10-CM

## 2020-12-04 DIAGNOSIS — E78.00: ICD-10-CM

## 2020-12-04 DIAGNOSIS — Z79.899: ICD-10-CM

## 2020-12-04 DIAGNOSIS — Z88.8: ICD-10-CM

## 2020-12-04 DIAGNOSIS — K59.00: ICD-10-CM

## 2020-12-04 DIAGNOSIS — I10: ICD-10-CM

## 2020-12-04 DIAGNOSIS — Z95.828: ICD-10-CM

## 2020-12-04 DIAGNOSIS — I48.0: Primary | ICD-10-CM

## 2020-12-04 DIAGNOSIS — E55.9: ICD-10-CM

## 2020-12-18 ENCOUNTER — TELEPHONE (OUTPATIENT)
Dept: CARDIOLOGY CLINIC | Age: 80
End: 2020-12-18

## 2020-12-18 NOTE — TELEPHONE ENCOUNTER
[address] saw you on 11/05/20 and wasn't having any problems then but since then she has had 3 episodes of palpitations/afib happens at night and when she gets up thing go back to normal.  She said you had mentioned a holter  Monitor? She said she would be willing to wear one now if you feel would be needed. Please Advise.   Thank You

## 2020-12-23 ENCOUNTER — TELEPHONE (OUTPATIENT)
Dept: CARDIOLOGY CLINIC | Age: 80
End: 2020-12-23

## 2021-01-21 ENCOUNTER — HOSPITAL ENCOUNTER (OUTPATIENT)
Dept: HOSPITAL 83 - INJECTION | Age: 81
Discharge: HOME | End: 2021-01-21
Attending: INTERNAL MEDICINE
Payer: MEDICARE

## 2021-01-21 VITALS — SYSTOLIC BLOOD PRESSURE: 146 MMHG | DIASTOLIC BLOOD PRESSURE: 75 MMHG

## 2021-01-21 DIAGNOSIS — M48.00: ICD-10-CM

## 2021-01-21 DIAGNOSIS — M81.0: Primary | ICD-10-CM

## 2021-01-21 DIAGNOSIS — K21.9: ICD-10-CM

## 2021-01-21 DIAGNOSIS — E78.00: ICD-10-CM

## 2021-01-21 DIAGNOSIS — I48.91: ICD-10-CM

## 2021-01-21 DIAGNOSIS — Z87.891: ICD-10-CM

## 2021-01-21 DIAGNOSIS — I10: ICD-10-CM

## 2021-01-28 ENCOUNTER — HOSPITAL ENCOUNTER (OUTPATIENT)
Dept: HOSPITAL 83 - RAD | Age: 81
Discharge: HOME | End: 2021-01-28
Attending: INTERNAL MEDICINE
Payer: MEDICARE

## 2021-01-28 DIAGNOSIS — R09.89: ICD-10-CM

## 2021-01-28 DIAGNOSIS — M81.8: Primary | ICD-10-CM

## 2021-03-31 ENCOUNTER — TELEPHONE (OUTPATIENT)
Dept: CARDIOLOGY CLINIC | Age: 81
End: 2021-03-31

## 2021-03-31 NOTE — TELEPHONE ENCOUNTER
Ronell Sicard normally sees Dr. Luis E Fowler but he is out this week. She said last night she had an episode of AFIB that lasted about 2 hours but this time she also had some chest pains that lasted about 20 mins. She thought about going to hospital but since they only lasted 20 mins she did not. She at one time had a script of Nitroglycerin and would like to get another script of that, she doesn't want to increase her metoprolol because that already doesn't agree with her. Please advise. 04/05/21Nellie Neumann called back, I told her the Nitro was sent to pharmacy and she wants to make sure she needs to do nothing more but take the nitro if needed.

## 2021-04-01 RX ORDER — NITROGLYCERIN 0.4 MG/1
0.4 TABLET SUBLINGUAL EVERY 5 MIN PRN
Qty: 25 TABLET | Refills: 3 | Status: SHIPPED
Start: 2021-04-01 | End: 2022-05-18 | Stop reason: SDUPTHER

## 2021-04-06 NOTE — TELEPHONE ENCOUNTER
If chest pain recurrent or does not re,ieve with 3 s/l Nitro go to ER. If heart racing persists go to ER.

## 2021-05-03 ENCOUNTER — TELEPHONE (OUTPATIENT)
Dept: CARDIOLOGY CLINIC | Age: 81
End: 2021-05-03

## 2021-05-03 ENCOUNTER — NURSE ONLY (OUTPATIENT)
Dept: CARDIOLOGY CLINIC | Age: 81
End: 2021-05-03
Payer: MEDICARE

## 2021-05-03 DIAGNOSIS — I48.0 PAROXYSMAL ATRIAL FIBRILLATION (HCC): Primary | ICD-10-CM

## 2021-05-03 PROCEDURE — 93000 ELECTROCARDIOGRAM COMPLETE: CPT | Performed by: STUDENT IN AN ORGANIZED HEALTH CARE EDUCATION/TRAINING PROGRAM

## 2021-05-03 NOTE — TELEPHONE ENCOUNTER
Delio Mcelroy wasn't feeling well over weekend having some pain in chest and left arm, she didn't go to ER and she is feeling better now and would like to know if she can come to office to have EKG done? Is that OK with you?

## 2021-05-06 ENCOUNTER — HOSPITAL ENCOUNTER (EMERGENCY)
Dept: HOSPITAL 83 - ED | Age: 81
Discharge: HOME | End: 2021-05-06
Payer: MEDICARE

## 2021-05-06 VITALS — SYSTOLIC BLOOD PRESSURE: 153 MMHG | DIASTOLIC BLOOD PRESSURE: 85 MMHG

## 2021-05-06 VITALS — BODY MASS INDEX: 26.87 KG/M2 | HEIGHT: 61.97 IN | WEIGHT: 146 LBS

## 2021-05-06 DIAGNOSIS — S89.91XA: Primary | ICD-10-CM

## 2021-05-06 DIAGNOSIS — Z79.899: ICD-10-CM

## 2021-05-06 DIAGNOSIS — I10: ICD-10-CM

## 2021-05-06 DIAGNOSIS — Z98.890: ICD-10-CM
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
## 2021-05-06 DIAGNOSIS — Z88.8: ICD-10-CM

## 2021-05-26 ENCOUNTER — HOSPITAL ENCOUNTER (OUTPATIENT)
Dept: HOSPITAL 83 - RESCLI | Age: 81
Discharge: HOME | End: 2021-05-26
Attending: STUDENT IN AN ORGANIZED HEALTH CARE EDUCATION/TRAINING PROGRAM
Payer: MEDICARE

## 2021-05-26 DIAGNOSIS — M48.00: ICD-10-CM

## 2021-05-26 DIAGNOSIS — I48.0: Primary | ICD-10-CM

## 2021-05-26 DIAGNOSIS — Z95.828: ICD-10-CM

## 2021-05-26 DIAGNOSIS — I10: ICD-10-CM

## 2021-05-26 DIAGNOSIS — M81.0: ICD-10-CM

## 2021-05-26 DIAGNOSIS — E55.9: ICD-10-CM

## 2021-05-26 DIAGNOSIS — K59.00: ICD-10-CM

## 2021-05-26 DIAGNOSIS — Z79.899: ICD-10-CM

## 2021-06-22 ENCOUNTER — HOSPITAL ENCOUNTER (OUTPATIENT)
Dept: HOSPITAL 83 - US | Age: 81
Discharge: HOME | End: 2021-06-22
Attending: INTERNAL MEDICINE
Payer: MEDICARE

## 2021-06-22 DIAGNOSIS — I65.23: Primary | ICD-10-CM

## 2021-07-22 ENCOUNTER — HOSPITAL ENCOUNTER (OUTPATIENT)
Dept: HOSPITAL 83 - INJECTION | Age: 81
Discharge: HOME | End: 2021-07-22
Attending: INTERNAL MEDICINE
Payer: MEDICARE

## 2021-07-22 DIAGNOSIS — G62.9: ICD-10-CM

## 2021-07-22 DIAGNOSIS — I10: ICD-10-CM

## 2021-07-22 DIAGNOSIS — M81.0: Primary | ICD-10-CM

## 2021-07-22 DIAGNOSIS — Z87.891: ICD-10-CM

## 2021-07-22 DIAGNOSIS — I48.91: ICD-10-CM

## 2021-07-22 DIAGNOSIS — E78.00: ICD-10-CM

## 2021-07-22 DIAGNOSIS — K21.9: ICD-10-CM

## 2021-09-29 ENCOUNTER — OFFICE VISIT (OUTPATIENT)
Dept: CARDIOLOGY CLINIC | Age: 81
End: 2021-09-29
Payer: MEDICARE

## 2021-09-29 VITALS
WEIGHT: 153 LBS | SYSTOLIC BLOOD PRESSURE: 124 MMHG | BODY MASS INDEX: 28.89 KG/M2 | HEART RATE: 66 BPM | RESPIRATION RATE: 18 BRPM | HEIGHT: 61 IN | DIASTOLIC BLOOD PRESSURE: 68 MMHG

## 2021-09-29 DIAGNOSIS — I48.0 PAROXYSMAL ATRIAL FIBRILLATION (HCC): ICD-10-CM

## 2021-09-29 DIAGNOSIS — I10 ESSENTIAL HYPERTENSION: Primary | ICD-10-CM

## 2021-09-29 DIAGNOSIS — I48.91 ATRIAL FIBRILLATION, UNSPECIFIED TYPE (HCC): ICD-10-CM

## 2021-09-29 DIAGNOSIS — R00.2 PALPITATIONS: ICD-10-CM

## 2021-09-29 DIAGNOSIS — I25.10 MILD CORONARY ARTERY DISEASE: ICD-10-CM

## 2021-09-29 PROCEDURE — 4040F PNEUMOC VAC/ADMIN/RCVD: CPT | Performed by: INTERNAL MEDICINE

## 2021-09-29 PROCEDURE — 1090F PRES/ABSN URINE INCON ASSESS: CPT | Performed by: INTERNAL MEDICINE

## 2021-09-29 PROCEDURE — 99214 OFFICE O/P EST MOD 30 MIN: CPT | Performed by: INTERNAL MEDICINE

## 2021-09-29 PROCEDURE — 93000 ELECTROCARDIOGRAM COMPLETE: CPT | Performed by: INTERNAL MEDICINE

## 2021-09-29 PROCEDURE — G8400 PT W/DXA NO RESULTS DOC: HCPCS | Performed by: INTERNAL MEDICINE

## 2021-09-29 PROCEDURE — 1123F ACP DISCUSS/DSCN MKR DOCD: CPT | Performed by: INTERNAL MEDICINE

## 2021-09-29 PROCEDURE — G8427 DOCREV CUR MEDS BY ELIG CLIN: HCPCS | Performed by: INTERNAL MEDICINE

## 2021-09-29 PROCEDURE — G8417 CALC BMI ABV UP PARAM F/U: HCPCS | Performed by: INTERNAL MEDICINE

## 2021-09-29 PROCEDURE — 1036F TOBACCO NON-USER: CPT | Performed by: INTERNAL MEDICINE

## 2021-09-29 NOTE — PROGRESS NOTES
OFFICE VISIT     PRIMARY CARE PHYSICIAN:      Kristine Perdomo MD       ALLERGIES / SENSITIVITIES:        Allergies   Allergen Reactions    Cortisone Palpitations    Prednisone           REVIEWED MEDICATIONS:        Current Outpatient Medications:     ascorbic acid (VITAMIN C) 1000 MG tablet, Take 1,000 mg by mouth daily, Disp: , Rfl:     nitroGLYCERIN (NITROSTAT) 0.4 MG SL tablet, Place 1 tablet under the tongue every 5 minutes as needed for Chest pain (take 1 tablet every 5 minutes for up to 15 minutes not to exceed 3 tablets), Disp: 25 tablet, Rfl: 3    Calcium Polycarbophil (FIBER) 625 MG TABS, Take by mouth, Disp: , Rfl:     Cholecalciferol (VITAMIN D) 125 MCG (5000 UT) CAPS, Take by mouth, Disp: , Rfl:     Potassium 75 MG TABS, Take by mouth, Disp: , Rfl:     rivaroxaban (XARELTO) 20 MG TABS tablet, Take 1 tablet by mouth daily (with breakfast), Disp: 30 tablet, Rfl: 0    LATANOPROST OP, Apply 1 drop to eye, Disp: , Rfl:     gabapentin (NEURONTIN) 300 MG capsule, Take 1 capsule by mouth daily. , Disp: , Rfl: 3    Multiple Vitamins-Minerals (MULTIVITAMIN ADULT PO), Take by mouth daily, Disp: , Rfl:     metoprolol succinate (TOPROL XL) 25 MG extended release tablet, Take 25 mg by mouth daily , Disp: , Rfl:     amLODIPine (NORVASC) 5 MG tablet, TAKE ONE TABLET BY MOUTH ONCE DAILY, Disp: , Rfl: 3      S: REASON FOR VISIT:       Chief Complaint   Patient presents with    Atrial Fibrillation     Cardiac check up, occasional chest pain and A-fib, last OV 11/5/2020    Hypertension    Coronary Artery Disease          History of Present Illness:       Office Visit for follow up of A fib, HTN, mild CAD, Aortic regurgitation              81 yr Edison Malin with history of A fib, HTN, mild VHD-AI, mild CAD by cath in 2019 came for f/u visit.    No hospitalizations or surgeries since last visit   C/o occ heart racing, no associated dizzy or syncope   Patient is compliant with all medications   C/o occ CP - when she is in A fib, like \"ache\", no radiation, no assocaited Sx, relieves when her heart rate comes down. Takes s/l nitro, not much change, did not took extra Metoprolol as advised   Souleymane any exertional chest pain or short of breath   Answered about NEMO exclusion to decrease stroke risk-Watchman device discussed   Active at home   No orthopnea   Try to watch diet          Past Medical History:   Diagnosis Date    Arthritis     Atrial fibrillation ([address]     HTN (hypertension)             Past Surgical History:   Procedure Laterality Date    CARDIAC CATHETERIZATION  09/27/2019    Dr. Parra Sees Left Heart Catheterization        No family history on file. Social History     Tobacco Use    Smoking status: Former Smoker    Smokeless tobacco: Never Used   Substance Use Topics    Alcohol use: Yes     Alcohol/week: 1.0 standard drinks     Types: 1 Glasses of wine per week     Comment: socially         Review of Systems:  Constitutional: negative for fever and chills, or significant weight loss  HEENT: negative for acute visual symptoms or auditory problems, no dysphagia  Respiratory: negative for cough, wheezing, or hemoptysis  Cardiovascular: negative for chest pain, palpitations, and dyspnea  Gastrointestinal: negative for abdominal pain, diarrhea, nausea and vomiting  Endocrine: Negative for polyuria and polydyspsia  Genitourinary:negative for dysuria and hematuria  Derm: negative for rash and skin lesion(s)  Neurological: negative for tingling, numbness, weakness, seizures and tremors  Endocrine: negative for polydipsia and polyuria  Musculoskeletal: negative for pain or tenderness  Psychiatric: negative for anxiety, depression, or suicidal ideations         O:  COMPLETE PHYSICAL EXAM:       /68   Pulse 66   Resp 18   Ht 5' 1\" (1.549 m)   Wt 153 lb (69.4 kg)   BMI 28.91 kg/m²       General:   Patient alert, comfortable, no distress. Appears stated age.    HEENT:    Pupils equal, no icterus, no nasal drainage, tongue moist.   Neck:              No masses, Thyroid not palpable. No elevated JVD, No carotid bruit. Chest:   Normal configuration, non tender. Lungs:   Clear to auscultation bilaterally, few scattered rhonchi. Cardiovascular:  Regular rhythm, 2/6 EDM, No S3, PMI at 5th ICS, no palpable thrills,    Abdomen:  Soft, Non tender, Bowel sounds normal, no pulsatile abdominal aorta, no palpable masses. Extremities:  No edema. Distal pulses palpable. No cyanosis, no clubbing. Skin:   Good turgor, warm and dry, no cyanosis. Musculoskeletal: No joint swelling or deformity. Neuro:   Cranial nerves grossly intact; No focal neurologic deficit. Psych:   Alert, good mood and effect. REVIEW OF DIAGNOSTIC TESTS:        Electrocardiogram: Reviewed   Cath - 9/27/2019 noted - 50% of diagonal branch of the LAD and 30% proximal RCA; LV EF normal.    Holter:  12/2020 - One episode of A fib   Echo 1/9/2019 -EF 70%, mild LVH, DD1, Mild AI       A/P:   ASSESSMENT / PLAN:    Rodrigo Menendez was seen today for atrial fibrillation, hypertension and coronary artery disease. Diagnoses and all orders for this visit:       Paroxysmal A-fib ([address] - Coumadin changed to Xarelto for [address] in 11/2020. In Sinus  -     EKG 12 lead     Mild coronary artery disease - ([facility] 9/27/2019 [facility] [employer] )- On BB, No ASA due to [address]; she declined Statin - No exertiona chest pains    Essential hypertension - Controlled  -     EKG 12 lead     Intermittent Heart palpitations - Avoid caffiene, Continue BB; Take extra Metoprolol as needed for HR >120 and monitor BP if gets dizzy. Aortic regurgitation - Mild, Echo Jan 2019    Preventive Cardiology: Low cholesterol diet, regular exercise as tolerate discussed. Monitor BP and heart rates. Above recommendations discussed with her. All questions answered about cardiac diagnoses and cardiac medications. Continue current medications.                 Compliance with medications and

## 2021-11-05 ENCOUNTER — HOSPITAL ENCOUNTER (OUTPATIENT)
Dept: HOSPITAL 83 - RESCLI | Age: 81
Discharge: HOME | End: 2021-11-05
Attending: INTERNAL MEDICINE
Payer: MEDICARE

## 2021-11-05 DIAGNOSIS — I10: Primary | ICD-10-CM

## 2021-11-05 DIAGNOSIS — Z79.899: ICD-10-CM

## 2021-11-05 DIAGNOSIS — M81.0: ICD-10-CM

## 2021-11-05 DIAGNOSIS — Z98.890: ICD-10-CM

## 2021-11-05 DIAGNOSIS — E55.9: ICD-10-CM

## 2021-11-05 DIAGNOSIS — I48.0: ICD-10-CM

## 2021-11-05 DIAGNOSIS — Z88.8: ICD-10-CM

## 2021-11-05 DIAGNOSIS — G89.29: ICD-10-CM

## 2021-11-05 DIAGNOSIS — K59.00: ICD-10-CM

## 2022-02-07 ENCOUNTER — HOSPITAL ENCOUNTER (OUTPATIENT)
Dept: HOSPITAL 83 - INJECTION | Age: 82
Discharge: HOME | End: 2022-02-07
Attending: INTERNAL MEDICINE
Payer: MEDICARE

## 2022-02-07 VITALS — SYSTOLIC BLOOD PRESSURE: 135 MMHG | DIASTOLIC BLOOD PRESSURE: 76 MMHG

## 2022-02-07 DIAGNOSIS — M81.0: Primary | ICD-10-CM

## 2022-02-07 DIAGNOSIS — I10: ICD-10-CM

## 2022-02-07 DIAGNOSIS — K21.9: ICD-10-CM

## 2022-02-07 DIAGNOSIS — I48.91: ICD-10-CM

## 2022-02-07 DIAGNOSIS — M48.00: ICD-10-CM

## 2022-02-07 DIAGNOSIS — Z87.891: ICD-10-CM

## 2022-02-07 DIAGNOSIS — E78.00: ICD-10-CM

## 2022-03-08 ENCOUNTER — OFFICE VISIT (OUTPATIENT)
Dept: CARDIOLOGY CLINIC | Age: 82
End: 2022-03-08
Payer: MEDICARE

## 2022-03-08 VITALS
WEIGHT: 154 LBS | BODY MASS INDEX: 29.07 KG/M2 | HEART RATE: 61 BPM | SYSTOLIC BLOOD PRESSURE: 132 MMHG | RESPIRATION RATE: 18 BRPM | DIASTOLIC BLOOD PRESSURE: 68 MMHG | HEIGHT: 61 IN

## 2022-03-08 DIAGNOSIS — I35.1 NONRHEUMATIC AORTIC VALVE INSUFFICIENCY: ICD-10-CM

## 2022-03-08 DIAGNOSIS — I25.10 MILD CORONARY ARTERY DISEASE: ICD-10-CM

## 2022-03-08 DIAGNOSIS — I48.0 PAROXYSMAL ATRIAL FIBRILLATION (HCC): Primary | ICD-10-CM

## 2022-03-08 DIAGNOSIS — I10 ESSENTIAL HYPERTENSION: ICD-10-CM

## 2022-03-08 DIAGNOSIS — R07.89 CHEST PAIN, ATYPICAL: ICD-10-CM

## 2022-03-08 PROCEDURE — 4040F PNEUMOC VAC/ADMIN/RCVD: CPT | Performed by: INTERNAL MEDICINE

## 2022-03-08 PROCEDURE — 93000 ELECTROCARDIOGRAM COMPLETE: CPT | Performed by: INTERNAL MEDICINE

## 2022-03-08 PROCEDURE — 1123F ACP DISCUSS/DSCN MKR DOCD: CPT | Performed by: INTERNAL MEDICINE

## 2022-03-08 PROCEDURE — 1036F TOBACCO NON-USER: CPT | Performed by: INTERNAL MEDICINE

## 2022-03-08 PROCEDURE — G8417 CALC BMI ABV UP PARAM F/U: HCPCS | Performed by: INTERNAL MEDICINE

## 2022-03-08 PROCEDURE — 1090F PRES/ABSN URINE INCON ASSESS: CPT | Performed by: INTERNAL MEDICINE

## 2022-03-08 PROCEDURE — G8484 FLU IMMUNIZE NO ADMIN: HCPCS | Performed by: INTERNAL MEDICINE

## 2022-03-08 PROCEDURE — G8400 PT W/DXA NO RESULTS DOC: HCPCS | Performed by: INTERNAL MEDICINE

## 2022-03-08 PROCEDURE — 99214 OFFICE O/P EST MOD 30 MIN: CPT | Performed by: INTERNAL MEDICINE

## 2022-03-08 PROCEDURE — G8427 DOCREV CUR MEDS BY ELIG CLIN: HCPCS | Performed by: INTERNAL MEDICINE

## 2022-03-08 NOTE — PATIENT INSTRUCTIONS
Call with test results  Call for chest pain on activity  Take extra Metoprolol for fast heart rates as needed

## 2022-03-08 NOTE — PROGRESS NOTES
OFFICE VISIT     PRIMARY CARE PHYSICIAN:      Kristan Licona MD       ALLERGIES / SENSITIVITIES:        Allergies   Allergen Reactions    Cortisone Palpitations    Prednisone           REVIEWED MEDICATIONS:        Current Outpatient Medications:     ascorbic acid (VITAMIN C) 1000 MG tablet, Take 1,000 mg by mouth daily, Disp: , Rfl:     nitroGLYCERIN (NITROSTAT) 0.4 MG SL tablet, Place 1 tablet under the tongue every 5 minutes as needed for Chest pain (take 1 tablet every 5 minutes for up to 15 minutes not to exceed 3 tablets), Disp: 25 tablet, Rfl: 3    Calcium Polycarbophil (FIBER) 625 MG TABS, Take by mouth, Disp: , Rfl:     Cholecalciferol (VITAMIN D) 125 MCG (5000 UT) CAPS, Take by mouth, Disp: , Rfl:     Potassium 75 MG TABS, Take 1 tablet by mouth every other day , Disp: , Rfl:     rivaroxaban (XARELTO) 20 MG TABS tablet, Take 1 tablet by mouth daily (with breakfast), Disp: 30 tablet, Rfl: 0    LATANOPROST OP, Apply 1 drop to eye, Disp: , Rfl:     gabapentin (NEURONTIN) 300 MG capsule, Take 1 capsule by mouth daily. , Disp: , Rfl: 3    Multiple Vitamins-Minerals (MULTIVITAMIN ADULT PO), Take by mouth daily, Disp: , Rfl:     metoprolol succinate (TOPROL XL) 25 MG extended release tablet, Take 25 mg by mouth daily , Disp: , Rfl:     amLODIPine (NORVASC) 5 MG tablet, TAKE ONE TABLET BY MOUTH ONCE DAILY, Disp: , Rfl: 3      S: REASON FOR VISIT:       Chief Complaint   Patient presents with    6 Month Follow-Up     Cardiac check up, fatigued, last OV 09/29/2021          History of Present Illness:       Office Visit for follow up of A fib, HTN, mild CAD, Aortic regurgitation              82 yr [facility] with history of A fib, HTN, mild VHD-AI, mild CAD by cath in 2019 came for f/u visit.    No hospitalizations or surgeries since last visit   Patient is compliant with all medications   C/o ' fatigue\"   hAVING 0 [facility] BETWEEN SHOULDER PAIN WITH ACIVITY - sCHEDULED FOR STRESS TEST 3/28    Souleymane any exertional chest pain or short of breath   No palpitations, dizzy or syncope. Active at home   No orthopnea   Try to watch diet          Past Medical History:   Diagnosis Date    Arthritis     Atrial fibrillation ([address]     HTN (hypertension)             Past Surgical History:   Procedure Laterality Date    CARDIAC CATHETERIZATION  09/27/2019    Dr. Estela Eid Left Heart Catheterization        History reviewed. No pertinent family history. Social History     Tobacco Use    Smoking status: Former Smoker    Smokeless tobacco: Never Used   Substance Use Topics    Alcohol use: Yes     Alcohol/week: 1.0 standard drink     Types: 1 Glasses of wine per week     Comment: socially         Review of Systems:  Constitutional: negative for fever and chills, or significant weight loss  HEENT: negative for acute visual symptoms or auditory problems, no dysphagia  Respiratory: negative for cough, wheezing, or hemoptysis  Cardiovascular: negative for chest pain, palpitations, and dyspnea  Gastrointestinal: negative for abdominal pain, diarrhea, nausea and vomiting  Endocrine: Negative for polyuria and polydyspsia  Genitourinary:negative for dysuria and hematuria  Derm: negative for rash and skin lesion(s)  Neurological: negative for tingling, numbness, weakness, seizures and tremors  Endocrine: negative for polydipsia and polyuria  Musculoskeletal: negative for pain or tenderness  Psychiatric: negative for anxiety, depression, or suicidal ideations         O:  COMPLETE PHYSICAL EXAM:       /68   Pulse 61   Resp 18   Ht 5' 1\" (1.549 m)   Wt 154 lb (69.9 kg)   BMI 29.10 kg/m²       General:   Patient alert, comfortable, no distress. Appears stated age. HEENT:    Pupils equal, no icterus, no nasal drainage, tongue moist.   Neck:              No masses, Thyroid not palpable. No elevated JVD, No carotid bruit.    Chest:   Normal configuration, non tender. Lungs:   Clear to auscultation bilaterally, few scattered rhonchi. Cardiovascular:  Regular rhythm, 1/6 systolic murmur, No S3, PMI at 5th ICS, no palpable thrills,    Abdomen:  Soft, Non tender, Bowel sounds normal, no pulsatile abdominal aorta, no palpable masses. Extremities:  No edema. Distal pulses palpable. No cyanosis, no clubbing. Skin:   Good turgor, warm and dry, no cyanosis. Musculoskeletal: No joint swelling or deformity. Neuro:   Cranial nerves grossly intact; No focal neurologic deficit. Psych:   Alert, good mood and effect. REVIEW OF DIAGNOSTIC TESTS:     Electrocardiogram: Reviewed              Cath - 9/27/2019 noted - 50% of diagonal branch of the LAD and 30% proximal RCA; LV EF normal.               Holter:  12/2020 - One episode of A fib              Echo 1/9/2019 -EF 70%, mild LVH, DD1, Mild AI                   A/P:              ASSESSMENT / PLAN:               Bonnie Yip was seen today for atrial fibrillation, hypertension and coronary artery disease.     Diagnoses and all orders for this visit:        Paroxysmal A-fib (HCC) - Coumadin changed to Xarelto for [address] [place] [address] in 11/2020. In Sinus  -     EKG 12 lead     Mild coronary artery disease - ([facility] 9/27/2019 [facility]Dr Anita David) - On BB, No ASA due to OAC; she declined Statin - No exertional chest pain-[facility] 2019     Chest pain, atypical  -[facility] 2019. Essential hypertension - Controlled  -     EKG 12 lead     Intermittent Heart palpitations - Avoid caffiene, Continue BB; Take extra Metoprolol as needed for HR >120 and monitor BP if gets dizzy.     Aortic regurgitation - Mild, Echo Jan 2019     Preventive Cardiology: Low cholesterol diet, regular exercise as tolerate discussed.                 Monitor BP and heart rates. Above recommendations discussed with her. All questions answered about cardiac diagnoses and cardiac medications. Continue current medications.                 Compliance with medications and f/u with all physicians discussed. Risk factor modification based on risk profile discussed. Call if any exertional chest pain, short of breath, dizzy or palpitations   Follow up in 6 months or earlier if needed.          [id number] [facility]  [address], [place], [facility]  [phone]

## 2022-03-28 ENCOUNTER — HOSPITAL ENCOUNTER (OUTPATIENT)
Dept: HOSPITAL 83 - CARD | Age: 82
Discharge: HOME | End: 2022-03-28
Attending: INTERNAL MEDICINE
Payer: MEDICARE

## 2022-03-28 DIAGNOSIS — I11.9: Primary | ICD-10-CM

## 2022-03-28 DIAGNOSIS — I48.21: ICD-10-CM

## 2022-03-28 DIAGNOSIS — I25.10: ICD-10-CM

## 2022-05-06 ENCOUNTER — HOSPITAL ENCOUNTER (OUTPATIENT)
Dept: HOSPITAL 83 - RESCLI | Age: 82
Discharge: HOME | End: 2022-05-06
Attending: INTERNAL MEDICINE
Payer: MEDICARE

## 2022-05-06 DIAGNOSIS — E83.42: ICD-10-CM

## 2022-05-06 DIAGNOSIS — J30.9: ICD-10-CM

## 2022-05-06 DIAGNOSIS — I48.0: ICD-10-CM

## 2022-05-06 DIAGNOSIS — N39.41: ICD-10-CM

## 2022-05-06 DIAGNOSIS — E55.9: ICD-10-CM

## 2022-05-06 DIAGNOSIS — Z79.899: ICD-10-CM

## 2022-05-06 DIAGNOSIS — I11.9: Primary | ICD-10-CM

## 2022-05-06 DIAGNOSIS — Z88.8: ICD-10-CM

## 2022-05-06 DIAGNOSIS — K59.00: ICD-10-CM

## 2022-05-06 DIAGNOSIS — I25.10: ICD-10-CM

## 2022-05-06 DIAGNOSIS — G89.29: ICD-10-CM

## 2022-05-17 PROBLEM — I48.0 PAROXYSMAL ATRIAL FIBRILLATION (HCC): Status: ACTIVE | Noted: 2018-01-04

## 2022-05-17 PROBLEM — I25.10 ATHEROSCLEROSIS OF CORONARY ARTERY: Status: ACTIVE | Noted: 2022-05-17

## 2022-05-17 PROBLEM — Z96.659 HISTORY OF TOTAL KNEE REPLACEMENT: Status: ACTIVE | Noted: 2022-05-17

## 2022-05-17 PROBLEM — M14.679 CHARCOT'S JOINT OF ANKLE: Status: ACTIVE | Noted: 2022-05-17

## 2022-05-17 PROBLEM — R73.01 IMPAIRED FASTING GLUCOSE: Status: ACTIVE | Noted: 2022-05-17

## 2022-05-17 PROBLEM — M19.079 PRIMARY LOCALIZED OSTEOARTHROSIS OF ANKLE AND FOOT: Status: ACTIVE | Noted: 2022-05-17

## 2022-05-17 PROBLEM — M15.9 OSTEOARTHRITIS OF MULTIPLE JOINTS: Status: ACTIVE | Noted: 2020-05-04

## 2022-05-17 PROBLEM — D64.9 ANEMIA: Status: ACTIVE | Noted: 2022-05-17

## 2022-05-18 ENCOUNTER — OFFICE VISIT (OUTPATIENT)
Dept: CARDIOLOGY CLINIC | Age: 82
End: 2022-05-18
Payer: MEDICARE

## 2022-05-18 VITALS
SYSTOLIC BLOOD PRESSURE: 132 MMHG | RESPIRATION RATE: 18 BRPM | HEIGHT: 61 IN | DIASTOLIC BLOOD PRESSURE: 72 MMHG | HEART RATE: 60 BPM | WEIGHT: 149 LBS | BODY MASS INDEX: 28.13 KG/M2

## 2022-05-18 DIAGNOSIS — I10 ESSENTIAL HYPERTENSION: Primary | ICD-10-CM

## 2022-05-18 PROCEDURE — 4040F PNEUMOC VAC/ADMIN/RCVD: CPT | Performed by: INTERNAL MEDICINE

## 2022-05-18 PROCEDURE — 93000 ELECTROCARDIOGRAM COMPLETE: CPT | Performed by: INTERNAL MEDICINE

## 2022-05-18 PROCEDURE — 1090F PRES/ABSN URINE INCON ASSESS: CPT | Performed by: INTERNAL MEDICINE

## 2022-05-18 PROCEDURE — G8427 DOCREV CUR MEDS BY ELIG CLIN: HCPCS | Performed by: INTERNAL MEDICINE

## 2022-05-18 PROCEDURE — 1036F TOBACCO NON-USER: CPT | Performed by: INTERNAL MEDICINE

## 2022-05-18 PROCEDURE — 1123F ACP DISCUSS/DSCN MKR DOCD: CPT | Performed by: INTERNAL MEDICINE

## 2022-05-18 PROCEDURE — 99214 OFFICE O/P EST MOD 30 MIN: CPT | Performed by: INTERNAL MEDICINE

## 2022-05-18 PROCEDURE — G8417 CALC BMI ABV UP PARAM F/U: HCPCS | Performed by: INTERNAL MEDICINE

## 2022-05-18 PROCEDURE — G8400 PT W/DXA NO RESULTS DOC: HCPCS | Performed by: INTERNAL MEDICINE

## 2022-05-18 RX ORDER — NITROGLYCERIN 0.4 MG/1
0.4 TABLET SUBLINGUAL EVERY 5 MIN PRN
Qty: 25 TABLET | Refills: 3 | Status: SHIPPED | OUTPATIENT
Start: 2022-05-18 | End: 2022-08-23

## 2022-05-18 NOTE — PROGRESS NOTES
CHIEF COMPLAINT: PAfib    HISTORY OF PRESENT ILLNESS: Patient is a 80 y.o. female seen at the request of Amina Onofre MD.      No CP or SOB. Past Medical History:   Diagnosis Date    Arthritis     Atherosclerosis of coronary artery 5/17/2022    Atrial fibrillation (HCC)     HTN (hypertension)        Patient Active Problem List   Diagnosis    Paroxysmal atrial fibrillation (HCC)    Artificial knee joint present    Essential hypertension    History of tobacco use    Arthropathy associated with neurological disorder    Osteoarthritis of multiple joints    Peripheral nerve disease    Long term current use of anticoagulant therapy    Glaucoma    Neuropathy    Sleep disorder    Atherosclerosis of coronary artery    Anemia    Charcot's joint of ankle    History of total knee replacement    Impaired fasting glucose    Primary localized osteoarthrosis of ankle and foot       Allergies   Allergen Reactions    Cortisone Palpitations    Prednisone        Current Outpatient Medications   Medication Sig Dispense Refill    ascorbic acid (VITAMIN C) 1000 MG tablet Take 1,000 mg by mouth daily      nitroGLYCERIN (NITROSTAT) 0.4 MG SL tablet Place 1 tablet under the tongue every 5 minutes as needed for Chest pain (take 1 tablet every 5 minutes for up to 15 minutes not to exceed 3 tablets) 25 tablet 3    Calcium Polycarbophil (FIBER) 625 MG TABS Take by mouth      Cholecalciferol (VITAMIN D) 125 MCG (5000 UT) CAPS Take by mouth      Potassium 75 MG TABS Take 1 tablet by mouth every other day       rivaroxaban (XARELTO) 20 MG TABS tablet Take 1 tablet by mouth daily (with breakfast) 30 tablet 0    LATANOPROST OP Apply 1 drop to eye      gabapentin (NEURONTIN) 300 MG capsule Take 1 capsule by mouth daily.   3    Multiple Vitamins-Minerals (MULTIVITAMIN ADULT PO) Take by mouth daily      metoprolol succinate (TOPROL XL) 25 MG extended release tablet Take 25 mg by mouth daily       amLODIPine (NORVASC) 5 MG tablet TAKE ONE TABLET BY MOUTH ONCE DAILY  3     No current facility-administered medications for this visit. Social History     Socioeconomic History    Marital status: Single     Spouse name: Not on file    Number of children: Not on file    Years of education: Not on file    Highest education level: Not on file   Occupational History    Not on file   Tobacco Use    Smoking status: Former Smoker    Smokeless tobacco: Never Used   Vaping Use    Vaping Use: Never used   Substance and Sexual Activity    Alcohol use: Yes     Alcohol/week: 1.0 standard drink     Types: 1 Glasses of wine per week     Comment: socially    Drug use: No    Sexual activity: Not on file   Other Topics Concern    Not on file   Social History Narrative    Not on file     Social Determinants of Health     Financial Resource Strain:     Difficulty of Paying Living Expenses: Not on file   Food Insecurity:     Worried About Running Out of Food in the Last Year: Not on file    Xiao of Food in the Last Year: Not on file   Transportation Needs:     Lack of Transportation (Medical): Not on file    Lack of Transportation (Non-Medical):  Not on file   Physical Activity:     Days of Exercise per Week: Not on file    Minutes of Exercise per Session: Not on file   Stress:     Feeling of Stress : Not on file   Social Connections:     Frequency of Communication with Friends and Family: Not on file    Frequency of Social Gatherings with Friends and Family: Not on file    Attends Mandaen Services: Not on file    Active Member of Clubs or Organizations: Not on file    Attends Club or Organization Meetings: Not on file    Marital Status: Not on file   Intimate Partner Violence:     Fear of Current or Ex-Partner: Not on file    Emotionally Abused: Not on file    Physically Abused: Not on file    Sexually Abused: Not on file   Housing Stability:     Unable to Pay for Housing in the Last Year: Not on file  Number of Places Lived in the Last Year: Not on file    Unstable Housing in the Last Year: Not on file       History reviewed. No pertinent family history. Review of Systems:  Heart: as above   Lungs: as above   Eyes: denies changes in vision or discharge. Ears: denies changes in hearing or pain. Nose: denies epistaxis or masses   Throat: denies sore throat or trouble swallowing. Neuro: denies numbness, tingling, tremors. Skin: denies rashes or itching. : denies hematuria, dysuria   GI: denies vomiting, diarrhea   Psych: denies mood changed, anxiety, depression. All other systems negative. Physical Exam   /72   Pulse 60   Resp 18   Ht 5' 1\" (1.549 m)   Wt 149 lb (67.6 kg)   BMI 28.15 kg/m²   Constitutional: Oriented to person, place, and time. Well-developed and well-nourished. No distress. Head: Normocephalic and atraumatic. Eyes: EOM are normal. Pupils are equal, round, and reactive to light. Neck: Normal range of motion. Neck supple. No hepatojugular reflux and no JVD present. Carotid bruit is not present. No tracheal deviation present. No thyromegaly present. Cardiovascular: Normal rate, regular rhythm, normal heart sounds and intact distal pulses. Exam reveals no gallop and no friction rub. No murmur heard. Pulmonary/Chest: Effort normal and breath sounds normal. No respiratory distress. No wheezes. No rales. No tenderness. Abdominal: Soft. Bowel sounds are normal. No distension and no mass. No tenderness. No rebound and no guarding. Musculoskeletal: Normal range of motion. No edema and no tenderness. Lymphadenopathy:   No cervical adenopathy. No groin adenopathy. Neurological: Alert and oriented to person, place, and time. Skin: Skin is warm and dry. No rash noted. Not diaphoretic. No erythema. Psychiatric: Normal mood and affect. Behavior is normal.     EKG personally reviewed 05/18/22:  normal EKG, normal sinus rhythm.     Cath 9/27/2019: 50% of diagonal branch of the LAD and 30% proximal RCA; LV EF normal.     Holter:  12/2020 - One episode of A fib    Echo Conclusion 1/9/2019:        LVEF 70%       No prior echo.        The left ventricle is normal size.        Mild concentric left ventricular hypertrophy.        There is normal LV segmental wall motion.        The left ventricular systolic function is normal.        Grade I - abnormal relaxation pattern.        No significant abnormality of valve function noted. ASSESSMENT AND PLAN:  Patient Active Problem List   Diagnosis    Paroxysmal atrial fibrillation (HCC)    Artificial knee joint present    Essential hypertension    History of tobacco use    Arthropathy associated with neurological disorder    Osteoarthritis of multiple joints    Peripheral nerve disease    Long term current use of anticoagulant therapy    Glaucoma    Neuropathy    Sleep disorder    Atherosclerosis of coronary artery    Anemia    Charcot's joint of ankle    History of total knee replacement    Impaired fasting glucose    Primary localized osteoarthrosis of ankle and foot     1. PAF:  In sinus. Xarelto. 2. CAD: Cath 9/27/2019: 50% of diagonal branch of the LAD and 30% proximal RCA; LV EF normal.      On BB, No ASA due to OAC; she declined statin. 3. HTN: Observe. 4. VHD: Mild AI by echo Jan 2019. Alisha Fajardo D.O.   Cardiologist  Cardiology, [facility]

## 2022-08-09 ENCOUNTER — HOSPITAL ENCOUNTER (OUTPATIENT)
Dept: HOSPITAL 83 - US | Age: 82
Discharge: HOME | End: 2022-08-09
Attending: NURSE PRACTITIONER
Payer: MEDICARE

## 2022-08-09 DIAGNOSIS — N28.1: ICD-10-CM

## 2022-08-09 DIAGNOSIS — K76.89: ICD-10-CM

## 2022-08-09 DIAGNOSIS — N20.0: Primary | ICD-10-CM

## 2022-08-15 ENCOUNTER — HOSPITAL ENCOUNTER (EMERGENCY)
Dept: HOSPITAL 83 - ED | Age: 82
Discharge: HOME | End: 2022-08-15
Payer: MEDICARE

## 2022-08-15 VITALS — BODY MASS INDEX: 25.76 KG/M2 | WEIGHT: 140 LBS | HEIGHT: 61.97 IN

## 2022-08-15 VITALS — DIASTOLIC BLOOD PRESSURE: 83 MMHG

## 2022-08-15 DIAGNOSIS — Z88.8: ICD-10-CM

## 2022-08-15 DIAGNOSIS — I10: Primary | ICD-10-CM

## 2022-08-15 DIAGNOSIS — Z79.899: ICD-10-CM

## 2022-08-16 ENCOUNTER — HOSPITAL ENCOUNTER (OUTPATIENT)
Dept: HOSPITAL 83 - INJECTION | Age: 82
Discharge: HOME | End: 2022-08-16
Attending: NURSE PRACTITIONER
Payer: MEDICARE

## 2022-08-16 VITALS — SYSTOLIC BLOOD PRESSURE: 146 MMHG | DIASTOLIC BLOOD PRESSURE: 77 MMHG

## 2022-08-16 DIAGNOSIS — K21.9: ICD-10-CM

## 2022-08-16 DIAGNOSIS — E78.00: ICD-10-CM

## 2022-08-16 DIAGNOSIS — I48.91: ICD-10-CM

## 2022-08-16 DIAGNOSIS — M48.00: ICD-10-CM

## 2022-08-16 DIAGNOSIS — M81.0: Primary | ICD-10-CM

## 2022-08-16 DIAGNOSIS — I10: ICD-10-CM

## 2022-08-23 ENCOUNTER — OFFICE VISIT (OUTPATIENT)
Dept: CARDIOLOGY CLINIC | Age: 82
End: 2022-08-23
Payer: MEDICARE

## 2022-08-23 VITALS
HEART RATE: 67 BPM | RESPIRATION RATE: 18 BRPM | HEIGHT: 61 IN | WEIGHT: 148 LBS | SYSTOLIC BLOOD PRESSURE: 142 MMHG | BODY MASS INDEX: 27.94 KG/M2 | DIASTOLIC BLOOD PRESSURE: 84 MMHG

## 2022-08-23 DIAGNOSIS — I10 ESSENTIAL HYPERTENSION: Primary | ICD-10-CM

## 2022-08-23 PROCEDURE — 1090F PRES/ABSN URINE INCON ASSESS: CPT | Performed by: INTERNAL MEDICINE

## 2022-08-23 PROCEDURE — G8427 DOCREV CUR MEDS BY ELIG CLIN: HCPCS | Performed by: INTERNAL MEDICINE

## 2022-08-23 PROCEDURE — 99214 OFFICE O/P EST MOD 30 MIN: CPT | Performed by: INTERNAL MEDICINE

## 2022-08-23 PROCEDURE — G8400 PT W/DXA NO RESULTS DOC: HCPCS | Performed by: INTERNAL MEDICINE

## 2022-08-23 PROCEDURE — 93000 ELECTROCARDIOGRAM COMPLETE: CPT | Performed by: INTERNAL MEDICINE

## 2022-08-23 PROCEDURE — 1036F TOBACCO NON-USER: CPT | Performed by: INTERNAL MEDICINE

## 2022-08-23 PROCEDURE — G8417 CALC BMI ABV UP PARAM F/U: HCPCS | Performed by: INTERNAL MEDICINE

## 2022-08-23 PROCEDURE — 1123F ACP DISCUSS/DSCN MKR DOCD: CPT | Performed by: INTERNAL MEDICINE

## 2022-08-23 NOTE — PROGRESS NOTES
CHIEF COMPLAINT: PAfib    HISTORY OF PRESENT ILLNESS: Patient is a 80 y.o. female seen at the request of Yovany Staples MD.      No CP or SOB. Past Medical History:   Diagnosis Date    Arthritis     Atherosclerosis of coronary artery 5/17/2022    Atrial fibrillation (HCC)     HTN (hypertension)        Patient Active Problem List   Diagnosis    Paroxysmal atrial fibrillation (HCC)    Artificial knee joint present    Essential hypertension    History of tobacco use    Arthropathy associated with neurological disorder    Osteoarthritis of multiple joints    Peripheral nerve disease    Long term current use of anticoagulant therapy    Glaucoma    Neuropathy    Sleep disorder    Atherosclerosis of coronary artery    Anemia    Charcot's joint of ankle    History of total knee replacement    Impaired fasting glucose    Primary localized osteoarthrosis of ankle and foot       Allergies   Allergen Reactions    Cortisone Palpitations    Prednisone        Current Outpatient Medications   Medication Sig Dispense Refill    nitroGLYCERIN (NITROSTAT) 0.4 MG SL tablet Place 1 tablet under the tongue every 5 minutes as needed for Chest pain (take 1 tablet every 5 minutes for up to 15 minutes not to exceed 3 tablets) 25 tablet 3    ascorbic acid (VITAMIN C) 1000 MG tablet Take 1,000 mg by mouth daily      Calcium Polycarbophil (FIBER) 625 MG TABS Take by mouth      Cholecalciferol (VITAMIN D) 125 MCG (5000 UT) CAPS Take by mouth      rivaroxaban (XARELTO) 20 MG TABS tablet Take 1 tablet by mouth daily (with breakfast) 30 tablet 0    LATANOPROST OP Apply 1 drop to eye      gabapentin (NEURONTIN) 300 MG capsule Take 1 capsule by mouth daily.   3    Multiple Vitamins-Minerals (MULTIVITAMIN ADULT PO) Take by mouth daily      metoprolol succinate (TOPROL XL) 25 MG extended release tablet Take 25 mg by mouth daily       amLODIPine (NORVASC) 5 MG tablet TAKE ONE TABLET BY MOUTH ONCE DAILY  3     No current facility-administered medications for this visit. Social History     Socioeconomic History    Marital status: Single     Spouse name: Not on file    Number of children: Not on file    Years of education: Not on file    Highest education level: Not on file   Occupational History    Not on file   Tobacco Use    Smoking status: Former    Smokeless tobacco: Never   Vaping Use    Vaping Use: Never used   Substance and Sexual Activity    Alcohol use: Yes     Alcohol/week: 1.0 standard drink     Types: 1 Glasses of wine per week     Comment: socially    Drug use: No    Sexual activity: Not on file   Other Topics Concern    Not on file   Social History Narrative    Not on file     Social Determinants of Health     Financial Resource Strain: Not on file   Food Insecurity: Not on file   Transportation Needs: Not on file   Physical Activity: Not on file   Stress: Not on file   Social Connections: Not on file   Intimate Partner Violence: Not on file   Housing Stability: Not on file       No family history on file. Review of Systems:  Heart: as above   Lungs: as above   Eyes: denies changes in vision or discharge. Ears: denies changes in hearing or pain. Nose: denies epistaxis or masses   Throat: denies sore throat or trouble swallowing. Neuro: denies numbness, tingling, tremors. Skin: denies rashes or itching. : denies hematuria, dysuria   GI: denies vomiting, diarrhea   Psych: denies mood changed, anxiety, depression. All other systems negative. Physical Exam   BP (!) 146/88   Pulse 67   Resp 18   Ht 5' 1\" (1.549 m)   Wt 148 lb (67.1 kg)   BMI 27.96 kg/m²   Constitutional: Oriented to person, place, and time. Well-developed and well-nourished. No distress. Head: Normocephalic and atraumatic. Eyes: EOM are normal. Pupils are equal, round, and reactive to light. Neck: Normal range of motion. Neck supple. No hepatojugular reflux and no JVD present. Carotid bruit is not present. No tracheal deviation present.  No thyromegaly present. Cardiovascular: Normal rate, regular rhythm, normal heart sounds and intact distal pulses. Exam reveals no gallop and no friction rub. No murmur heard. Pulmonary/Chest: Effort normal and breath sounds normal. No respiratory distress. No wheezes. No rales. No tenderness. Abdominal: Soft. Bowel sounds are normal. No distension and no mass. No tenderness. No rebound and no guarding. Musculoskeletal: Normal range of motion. No edema and no tenderness. Lymphadenopathy:   No cervical adenopathy. No groin adenopathy. Neurological: Alert and oriented to person, place, and time. Skin: Skin is warm and dry. No rash noted. Not diaphoretic. No erythema. Psychiatric: Normal mood and affect. Behavior is normal.     EKG personally reviewed 08/23/22:  normal EKG, normal sinus rhythm. Cath 9/27/2019: 50% of diagonal branch of the LAD and 30% proximal RCA; LV EF normal.     Holter:  12/2020 - One episode of A fib    Echo Conclusion 1/9/2019:        LVEF 70%       No prior echo. The left ventricle is normal size. Mild concentric left ventricular hypertrophy. There is normal LV segmental wall motion. The left ventricular systolic function is normal.        Grade I - abnormal relaxation pattern. No significant abnormality of valve function noted. ASSESSMENT AND PLAN:  Patient Active Problem List   Diagnosis    Paroxysmal atrial fibrillation (HCC)    Artificial knee joint present    Essential hypertension    History of tobacco use    Arthropathy associated with neurological disorder    Osteoarthritis of multiple joints    Peripheral nerve disease    Long term current use of anticoagulant therapy    Glaucoma    Neuropathy    Sleep disorder    Atherosclerosis of coronary artery    Anemia    Charcot's joint of ankle    History of total knee replacement    Impaired fasting glucose    Primary localized osteoarthrosis of ankle and foot     1. PAF:  In sinus. Xarelto/BB. 2. CAD: Cath 9/27/2019: 50% of diagonal branch of the LAD and 30% proximal RCA; LV EF normal.     On BB, No ASA due to [facility]; she declined statin. 3. HTN: Observe. 4. VHD: Mild AI by echo Jan 2019. Heena Mena D.O.   Cardiologist  Cardiology, [address] [place] [address]

## 2023-02-03 ENCOUNTER — HOSPITAL ENCOUNTER (OUTPATIENT)
Dept: HOSPITAL 83 - RAD | Age: 83
Discharge: HOME | End: 2023-02-03
Attending: NURSE PRACTITIONER
Payer: MEDICARE

## 2023-02-03 DIAGNOSIS — M15.0: ICD-10-CM

## 2023-02-03 DIAGNOSIS — M81.0: Primary | ICD-10-CM

## 2023-02-17 ENCOUNTER — HOSPITAL ENCOUNTER (OUTPATIENT)
Dept: HOSPITAL 83 - RAD | Age: 83
Discharge: HOME | End: 2023-02-17
Attending: NURSE PRACTITIONER
Payer: MEDICARE

## 2023-02-17 DIAGNOSIS — R05.1: Primary | ICD-10-CM

## 2023-02-27 PROBLEM — M54.30 SCIATICA: Status: ACTIVE | Noted: 2023-02-27

## 2023-02-27 PROBLEM — M81.0 AGE RELATED OSTEOPOROSIS: Status: ACTIVE | Noted: 2021-11-05

## 2023-02-27 PROBLEM — R19.4 CHANGE IN BOWEL HABIT: Status: ACTIVE | Noted: 2022-08-09

## 2023-02-27 PROBLEM — N39.41 URGE INCONTINENCE OF URINE: Status: ACTIVE | Noted: 2022-05-06

## 2023-02-27 PROBLEM — M48.00 SPINAL STENOSIS, SITE UNSPECIFIED: Status: ACTIVE | Noted: 2022-02-07

## 2023-02-27 PROBLEM — E55.9 VITAMIN D DEFICIENCY: Status: ACTIVE | Noted: 2021-11-05

## 2023-02-27 PROBLEM — I11.9 HYPERTENSIVE HEART DISEASE WITHOUT HEART FAILURE: Status: ACTIVE | Noted: 2022-03-28

## 2023-02-27 PROBLEM — J30.9 ALLERGIC RHINITIS: Status: ACTIVE | Noted: 2022-05-06

## 2023-02-27 PROBLEM — E83.42 HYPOMAGNESEMIA: Status: ACTIVE | Noted: 2022-05-06

## 2023-02-27 PROBLEM — G89.29 CHRONIC PAIN: Status: ACTIVE | Noted: 2021-11-05

## 2023-02-27 PROBLEM — N28.1 CYST OF KIDNEY, ACQUIRED: Status: ACTIVE | Noted: 2022-08-09

## 2023-02-27 PROBLEM — L02.91 CUTANEOUS ABSCESS, UNSPECIFIED: Status: ACTIVE | Noted: 2021-10-13

## 2023-02-27 PROBLEM — N20.0 CALCULUS OF KIDNEY: Status: ACTIVE | Noted: 2022-08-09

## 2023-02-27 PROBLEM — K21.9 GASTRO-ESOPHAGEAL REFLUX DISEASE WITHOUT ESOPHAGITIS: Status: ACTIVE | Noted: 2022-02-07

## 2023-02-27 PROBLEM — E78.00 PURE HYPERCHOLESTEROLEMIA: Status: ACTIVE | Noted: 2022-02-07

## 2023-02-27 PROBLEM — R53.81 OTHER MALAISE: Status: ACTIVE | Noted: 2022-03-28

## 2023-02-27 PROBLEM — K76.89 OTHER SPECIFIED DISEASES OF LIVER: Status: ACTIVE | Noted: 2022-08-09

## 2023-02-27 PROBLEM — K59.00 CONSTIPATION: Status: ACTIVE | Noted: 2021-11-05

## 2023-02-28 ENCOUNTER — OFFICE VISIT (OUTPATIENT)
Dept: CARDIOLOGY CLINIC | Age: 83
End: 2023-02-28
Payer: MEDICARE

## 2023-02-28 VITALS
WEIGHT: 151 LBS | HEIGHT: 61 IN | BODY MASS INDEX: 28.51 KG/M2 | RESPIRATION RATE: 18 BRPM | SYSTOLIC BLOOD PRESSURE: 141 MMHG | DIASTOLIC BLOOD PRESSURE: 86 MMHG | HEART RATE: 68 BPM

## 2023-02-28 DIAGNOSIS — I10 ESSENTIAL HYPERTENSION: Primary | ICD-10-CM

## 2023-02-28 PROCEDURE — 99214 OFFICE O/P EST MOD 30 MIN: CPT | Performed by: INTERNAL MEDICINE

## 2023-02-28 PROCEDURE — 3074F SYST BP LT 130 MM HG: CPT | Performed by: INTERNAL MEDICINE

## 2023-02-28 PROCEDURE — 1123F ACP DISCUSS/DSCN MKR DOCD: CPT | Performed by: INTERNAL MEDICINE

## 2023-02-28 PROCEDURE — G8484 FLU IMMUNIZE NO ADMIN: HCPCS | Performed by: INTERNAL MEDICINE

## 2023-02-28 PROCEDURE — 3078F DIAST BP <80 MM HG: CPT | Performed by: INTERNAL MEDICINE

## 2023-02-28 PROCEDURE — 1090F PRES/ABSN URINE INCON ASSESS: CPT | Performed by: INTERNAL MEDICINE

## 2023-02-28 PROCEDURE — 93000 ELECTROCARDIOGRAM COMPLETE: CPT | Performed by: INTERNAL MEDICINE

## 2023-02-28 PROCEDURE — 1036F TOBACCO NON-USER: CPT | Performed by: INTERNAL MEDICINE

## 2023-02-28 PROCEDURE — G8400 PT W/DXA NO RESULTS DOC: HCPCS | Performed by: INTERNAL MEDICINE

## 2023-02-28 PROCEDURE — G8427 DOCREV CUR MEDS BY ELIG CLIN: HCPCS | Performed by: INTERNAL MEDICINE

## 2023-02-28 PROCEDURE — G8417 CALC BMI ABV UP PARAM F/U: HCPCS | Performed by: INTERNAL MEDICINE

## 2023-02-28 NOTE — PROGRESS NOTES
CHIEF COMPLAINT: PAfib    HISTORY OF PRESENT ILLNESS: Patient is a 80 y.o. female seen at the request of Liseth Recio MD.      No CP or SOB.      Past Medical History:   Diagnosis Date    Arthritis     Atherosclerosis of coronary artery 5/17/2022    Atrial fibrillation (HCC)     HTN (hypertension)     Pure hypercholesterolemia 2/7/2022       Patient Active Problem List   Diagnosis    Paroxysmal atrial fibrillation (HCC)    Artificial knee joint present    Essential hypertension    History of tobacco use    Arthropathy associated with neurological disorder    Osteoarthritis of multiple joints    Peripheral nerve disease    Long term current use of anticoagulant therapy    Glaucoma    Neuropathy    Sleep disorder    Atherosclerosis of coronary artery    Anemia    Charcot's joint of ankle    History of total knee replacement    Impaired fasting glucose    Primary localized osteoarthrosis of ankle and foot    Age related osteoporosis    Allergic rhinitis    Calculus of kidney    Change in bowel habit    Chronic pain    Constipation    Cutaneous abscess, unspecified    Cyst of kidney, acquired    Gastro-esophageal reflux disease without esophagitis    Hypertensive heart disease without heart failure    Hypomagnesemia    Other malaise    Other specified diseases of liver    Pure hypercholesterolemia    Sciatica    Spinal stenosis, site unspecified    Urge incontinence of urine    Vitamin D deficiency       Allergies   Allergen Reactions    Cortisone Palpitations    Corticosteroids Other (See Comments)    Lisinopril      Other reaction(s): cough    Losartan Potassium      Other reaction(s): stomach upset    Prednisone     Statins Other (See Comments)       Current Outpatient Medications   Medication Sig Dispense Refill    nitroGLYCERIN (NITROSTAT) 0.4 MG SL tablet Place 1 tablet under the tongue every 5 minutes as needed for Chest pain (take 1 tablet every 5 minutes for up to 15 minutes not to exceed 3 tablets) 25 tablet 3    ascorbic acid (VITAMIN C) 1000 MG tablet Take 1,000 mg by mouth daily      Calcium Polycarbophil (FIBER) 625 MG TABS Take by mouth      Cholecalciferol (VITAMIN D) 125 MCG (5000 UT) CAPS Take by mouth      rivaroxaban (XARELTO) 20 MG TABS tablet Take 1 tablet by mouth daily (with breakfast) 30 tablet 0    LATANOPROST OP Apply 1 drop to eye      gabapentin (NEURONTIN) 300 MG capsule Take 1 capsule by mouth daily. 3    Multiple Vitamins-Minerals (MULTIVITAMIN ADULT PO) Take by mouth daily      metoprolol succinate (TOPROL XL) 25 MG extended release tablet Take 25 mg by mouth daily       amLODIPine (NORVASC) 5 MG tablet TAKE ONE TABLET BY MOUTH ONCE DAILY  3     No current facility-administered medications for this visit. Social History     Socioeconomic History    Marital status: Single     Spouse name: Not on file    Number of children: Not on file    Years of education: Not on file    Highest education level: Not on file   Occupational History    Not on file   Tobacco Use    Smoking status: Former    Smokeless tobacco: Never   Vaping Use    Vaping Use: Never used   Substance and Sexual Activity    Alcohol use: Yes     Alcohol/week: 1.0 standard drink     Types: 1 Glasses of wine per week     Comment: socially    Drug use: No    Sexual activity: Not on file   Other Topics Concern    Not on file   Social History Narrative    Not on file     Social Determinants of Health     Financial Resource Strain: Not on file   Food Insecurity: Not on file   Transportation Needs: Not on file   Physical Activity: Not on file   Stress: Not on file   Social Connections: Not on file   Intimate Partner Violence: Not on file   Housing Stability: Not on file       No family history on file. Review of Systems:  Heart: as above   Lungs: as above   Eyes: denies changes in vision or discharge. Ears: denies changes in hearing or pain. Nose: denies epistaxis or masses   Throat: denies sore throat or trouble swallowing. Neuro: denies numbness, tingling, tremors. Skin: denies rashes or itching. : denies hematuria, dysuria   GI: denies vomiting, diarrhea   Psych: denies mood changed, anxiety, depression. All other systems negative. Physical Exam   BP (!) 141/86   Pulse 68   Resp 18   Ht 5' 1\" (1.549 m)   Wt 151 lb (68.5 kg)   BMI 28.53 kg/m²   Constitutional: Oriented to person, place, and time. Well-developed and well-nourished. No distress. Head: Normocephalic and atraumatic. Eyes: EOM are normal. Pupils are equal, round, and reactive to light. Neck: Normal range of motion. Neck supple. No hepatojugular reflux and no JVD present. Carotid bruit is not present. No tracheal deviation present. No thyromegaly present. Cardiovascular: Normal rate, regular rhythm, normal heart sounds and intact distal pulses. Exam reveals no gallop and no friction rub. No murmur heard. Pulmonary/Chest: Effort normal and breath sounds normal. No respiratory distress. No wheezes. No rales. No tenderness. Abdominal: Soft. Bowel sounds are normal. No distension and no mass. No tenderness. No rebound and no guarding. Musculoskeletal: Normal range of motion. No edema and no tenderness. Lymphadenopathy:   No cervical adenopathy. No groin adenopathy. Neurological: Alert and oriented to person, place, and time. Skin: Skin is warm and dry. No rash noted. Not diaphoretic. No erythema. Psychiatric: Normal mood and affect. Behavior is normal.     EKG personally reviewed 02/28/23:  normal EKG, normal sinus rhythm. Cath 9/27/2019: 50% of diagonal branch of the LAD and 30% proximal RCA; LV EF normal.     Holter:  12/2020 - One episode of A fib    Echo Conclusion 1/9/2019:        LVEF 70%       No prior echo. The left ventricle is normal size. Mild concentric left ventricular hypertrophy. There is normal LV segmental wall motion.         The left ventricular systolic function is normal.        Grade I - abnormal relaxation pattern. No significant abnormality of valve function noted. ASSESSMENT AND PLAN:  Patient Active Problem List   Diagnosis    Paroxysmal atrial fibrillation (HCC)    Artificial knee joint present    Essential hypertension    History of tobacco use    Arthropathy associated with neurological disorder    Osteoarthritis of multiple joints    Peripheral nerve disease    Long term current use of anticoagulant therapy    Glaucoma    Neuropathy    Sleep disorder    Atherosclerosis of coronary artery    Anemia    Charcot's joint of ankle    History of total knee replacement    Impaired fasting glucose    Primary localized osteoarthrosis of ankle and foot    Age related osteoporosis    Allergic rhinitis    Calculus of kidney    Change in bowel habit    Chronic pain    Constipation    Cutaneous abscess, unspecified    Cyst of kidney, acquired    Gastro-esophageal reflux disease without esophagitis    Hypertensive heart disease without heart failure    Hypomagnesemia    Other malaise    Other specified diseases of liver    Pure hypercholesterolemia    Sciatica    Spinal stenosis, site unspecified    Urge incontinence of urine    Vitamin D deficiency     1. PAF:  In sinus. Xarelto/BB. 2. CAD: Cath 9/27/2019: 50% of diagonal branch of the LAD and 30% proximal RCA; LV EF normal.     On BB. No ASA due to [address]. Declined statin. 3. HTN: Observe. 4. VHD: Mild AI by echo Jan 2019. Anand Khan D.O.   Cardiologist  Cardiology, [facility]

## 2023-03-02 ENCOUNTER — HOSPITAL ENCOUNTER (OUTPATIENT)
Dept: HOSPITAL 83 - INJECTION | Age: 83
Discharge: HOME | End: 2023-03-02
Attending: NURSE PRACTITIONER
Payer: MEDICARE

## 2023-03-02 DIAGNOSIS — K21.9: ICD-10-CM

## 2023-03-02 DIAGNOSIS — M81.0: Primary | ICD-10-CM

## 2023-03-02 DIAGNOSIS — M48.00: ICD-10-CM

## 2023-03-02 DIAGNOSIS — E78.00: ICD-10-CM

## 2023-03-02 DIAGNOSIS — I48.91: ICD-10-CM

## 2023-03-02 DIAGNOSIS — I10: ICD-10-CM

## 2023-03-13 ENCOUNTER — HOSPITAL ENCOUNTER (OUTPATIENT)
Dept: HOSPITAL 83 - CT | Age: 83
Discharge: HOME | End: 2023-03-13
Attending: NURSE PRACTITIONER
Payer: MEDICARE

## 2023-03-13 DIAGNOSIS — K76.89: ICD-10-CM

## 2023-03-13 DIAGNOSIS — I77.810: Primary | ICD-10-CM

## 2023-09-05 ENCOUNTER — HOSPITAL ENCOUNTER (OUTPATIENT)
Dept: HOSPITAL 83 - INJECTION | Age: 83
Discharge: HOME | End: 2023-09-05
Attending: NURSE PRACTITIONER
Payer: MEDICARE

## 2023-09-05 VITALS — DIASTOLIC BLOOD PRESSURE: 74 MMHG | SYSTOLIC BLOOD PRESSURE: 151 MMHG

## 2023-09-05 DIAGNOSIS — E78.00: ICD-10-CM

## 2023-09-05 DIAGNOSIS — K21.9: ICD-10-CM

## 2023-09-05 DIAGNOSIS — G62.9: ICD-10-CM

## 2023-09-05 DIAGNOSIS — I10: ICD-10-CM

## 2023-09-05 DIAGNOSIS — M81.0: Primary | ICD-10-CM

## 2023-09-05 DIAGNOSIS — I48.91: ICD-10-CM

## 2023-09-05 DIAGNOSIS — Z87.891: ICD-10-CM

## 2023-09-19 ENCOUNTER — HOSPITAL ENCOUNTER (EMERGENCY)
Dept: HOSPITAL 83 - ED | Age: 83
Discharge: HOME | End: 2023-09-19
Payer: MEDICARE

## 2023-09-19 VITALS — DIASTOLIC BLOOD PRESSURE: 77 MMHG | SYSTOLIC BLOOD PRESSURE: 149 MMHG

## 2023-09-19 VITALS — WEIGHT: 141 LBS | BODY MASS INDEX: 25.95 KG/M2 | HEIGHT: 61.97 IN

## 2023-09-19 DIAGNOSIS — I48.91: ICD-10-CM

## 2023-09-19 DIAGNOSIS — Z95.5: ICD-10-CM
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
## 2023-09-19 DIAGNOSIS — I10: ICD-10-CM

## 2023-09-19 DIAGNOSIS — S22.32XA: Primary | ICD-10-CM
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
## 2023-09-19 DIAGNOSIS — Z88.8: ICD-10-CM

## 2023-09-19 DIAGNOSIS — E78.00: ICD-10-CM

## 2023-09-19 DIAGNOSIS — K21.9: ICD-10-CM
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
## 2023-09-19 DIAGNOSIS — Z98.890: ICD-10-CM

## 2023-11-15 ENCOUNTER — HOSPITAL ENCOUNTER (OUTPATIENT)
Dept: HOSPITAL 83 - RESCLI | Age: 83
Discharge: HOME | End: 2023-11-15
Attending: STUDENT IN AN ORGANIZED HEALTH CARE EDUCATION/TRAINING PROGRAM
Payer: MEDICARE

## 2023-11-15 DIAGNOSIS — Z98.890: ICD-10-CM

## 2023-11-15 DIAGNOSIS — I10: ICD-10-CM

## 2023-11-15 DIAGNOSIS — M54.41: ICD-10-CM

## 2023-11-15 DIAGNOSIS — J44.9: ICD-10-CM

## 2023-11-15 DIAGNOSIS — Z79.899: ICD-10-CM

## 2023-11-15 DIAGNOSIS — K59.00: ICD-10-CM

## 2023-11-15 DIAGNOSIS — I48.0: Primary | ICD-10-CM

## 2023-11-15 DIAGNOSIS — I25.10: ICD-10-CM

## 2023-11-15 DIAGNOSIS — M81.0: ICD-10-CM

## 2023-11-15 DIAGNOSIS — E55.9: ICD-10-CM

## 2023-11-15 DIAGNOSIS — F10.90: ICD-10-CM

## 2023-12-17 ENCOUNTER — HOSPITAL ENCOUNTER (EMERGENCY)
Dept: HOSPITAL 83 - ED | Age: 83
Discharge: HOME | End: 2023-12-17
Payer: MEDICARE

## 2023-12-17 VITALS — BODY MASS INDEX: 27.66 KG/M2 | WEIGHT: 172.13 LBS | HEIGHT: 65.98 IN

## 2023-12-17 VITALS — DIASTOLIC BLOOD PRESSURE: 75 MMHG | SYSTOLIC BLOOD PRESSURE: 129 MMHG

## 2023-12-17 DIAGNOSIS — Z88.8: ICD-10-CM

## 2023-12-17 DIAGNOSIS — K21.9: ICD-10-CM

## 2023-12-17 DIAGNOSIS — E78.00: ICD-10-CM

## 2023-12-17 DIAGNOSIS — E78.5: ICD-10-CM

## 2023-12-17 DIAGNOSIS — Z98.890: ICD-10-CM

## 2023-12-17 DIAGNOSIS — I10: ICD-10-CM

## 2023-12-17 DIAGNOSIS — I48.91: ICD-10-CM

## 2023-12-17 DIAGNOSIS — J10.1: Primary | ICD-10-CM

## 2023-12-17 DIAGNOSIS — Z20.822: ICD-10-CM

## 2023-12-17 DIAGNOSIS — Z95.5: ICD-10-CM

## 2023-12-17 DIAGNOSIS — R53.1: ICD-10-CM

## 2023-12-17 LAB
ALP SERPL-CCNC: 54 U/L (ref 46–116)
ALT SERPL W P-5'-P-CCNC: 15 U/L (ref 5–49)
BACTERIA #/AREA URNS HPF: (no result) /[HPF]
BASOPHILS # BLD AUTO: 0 10*3/UL (ref 0–0.1)
BASOPHILS NFR BLD AUTO: 0.7 % (ref 0–1)
BUN SERPL-MCNC: 6 MG/DL (ref 9–23)
CASTS URNS QL MICRO: (no result)
CHLORIDE SERPL-SCNC: 102 MMOL/L (ref 98–107)
EOSINOPHIL # BLD AUTO: 0 10*3/UL (ref 0–0.4)
EOSINOPHIL # BLD AUTO: 0.4 % (ref 1–4)
EPI CELLS #/AREA URNS HPF: (no result) /[HPF]
ERYTHROCYTE [DISTWIDTH] IN BLOOD BY AUTOMATED COUNT: 13.2 % (ref 0–14.5)
HCT VFR BLD AUTO: 40.5 % (ref 37–47)
KETONES UR QL STRIP: (no result)
LYMPHOCYTES # BLD AUTO: 0.4 10*3/UL (ref 1.3–4.4)
LYMPHOCYTES NFR BLD AUTO: 9.1 % (ref 27–41)
MCH RBC QN AUTO: 29.3 PG (ref 27–31)
MCHC RBC AUTO-ENTMCNC: 32.6 G/DL (ref 33–37)
MCV RBC AUTO: 89.8 FL (ref 81–99)
MONOCYTES # BLD AUTO: 0.6 10*3/UL (ref 0.1–1)
MONOCYTES NFR BLD MANUAL: 12.5 % (ref 3–9)
NEUT #: 3.5 10*3/UL (ref 2.3–7.9)
NEUT %: 77.1 % (ref 47–73)
NRBC BLD QL AUTO: 0 10*3/UL (ref 0–0)
PH UR STRIP: 7.5 [PH] (ref 4.5–8)
PLATELET # BLD AUTO: 121 10*3/UL (ref 130–400)
PMV BLD AUTO: 8.8 FL (ref 9.6–12.3)
POTASSIUM SERPL-SCNC: 3.8 MMOL/L (ref 3.4–5.1)
PROT SERPL-MCNC: 6.7 GM/DL (ref 6–8)
RBC # BLD AUTO: 4.51 10*6/UL (ref 4.1–5.1)
RBC #/AREA URNS HPF: (no result) RBC/HPF (ref 0–2)
SP GR UR: <= 1.005 (ref 1–1.03)
UROBILINOGEN UR STRIP-MCNC: 0.2 E.U./DL (ref 0–1)
WBC #/AREA URNS HPF: (no result) WBC/HPF (ref 0–5)
WBC NRBC COR # BLD AUTO: 4.5 10*3/UL (ref 4.8–10.8)

## 2024-01-16 ENCOUNTER — OFFICE VISIT (OUTPATIENT)
Dept: CARDIOLOGY CLINIC | Age: 84
End: 2024-01-16

## 2024-01-16 VITALS
WEIGHT: 150 LBS | HEIGHT: 62 IN | HEART RATE: 59 BPM | SYSTOLIC BLOOD PRESSURE: 126 MMHG | BODY MASS INDEX: 27.6 KG/M2 | DIASTOLIC BLOOD PRESSURE: 77 MMHG | RESPIRATION RATE: 18 BRPM

## 2024-01-16 DIAGNOSIS — I10 ESSENTIAL HYPERTENSION: Primary | ICD-10-CM

## 2024-01-16 DIAGNOSIS — I35.1 NONRHEUMATIC AORTIC VALVE INSUFFICIENCY: ICD-10-CM

## 2024-01-16 DIAGNOSIS — I48.0 PAROXYSMAL ATRIAL FIBRILLATION (HCC): ICD-10-CM

## 2024-01-16 DIAGNOSIS — I25.10 MILD CORONARY ARTERY DISEASE: ICD-10-CM

## 2024-01-16 RX ORDER — METOPROLOL SUCCINATE 25 MG
25 TABLET, EXTENDED RELEASE 24 HR ORAL 2 TIMES DAILY
Qty: 120 TABLET | Refills: 1
Start: 2024-01-16

## 2024-01-16 NOTE — PROGRESS NOTES
CHIEF COMPLAINT: PAfib    HISTORY OF PRESENT ILLNESS: Patient is a 83 y.o. female seen at the request of Sonja Acevedo MD.      Coming in for an urgent visit.  Complaining of palpitations.  Compliant with medications.  Takes Toprol 25 mg daily.  She describes her palpitations as a fluttering sensation as well as skipped beats.  Occurs nocturnally last for a few minutes.  Sometimes occurs during the daytime that last for up to 4 hours.  Has been trying to take an extra metoprolol at night when she has the palpitations    Past Medical History:   Diagnosis Date    Arthritis     Atherosclerosis of coronary artery 5/17/2022    Atrial fibrillation (HCC)     HTN (hypertension)     Pure hypercholesterolemia 2/7/2022       Patient Active Problem List   Diagnosis    Paroxysmal atrial fibrillation (HCC)    Artificial knee joint present    Essential hypertension    History of tobacco use    Arthropathy associated with neurological disorder    Osteoarthritis of multiple joints    Peripheral nerve disease    Long term current use of anticoagulant therapy    Glaucoma    Neuropathy    Sleep disorder    Atherosclerosis of coronary artery    Anemia    Charcot's joint of ankle    History of total knee replacement    Impaired fasting glucose    Primary localized osteoarthrosis of ankle and foot    Age related osteoporosis    Allergic rhinitis    Calculus of kidney    Change in bowel habit    Chronic pain    Constipation    Cutaneous abscess, unspecified    Cyst of kidney, acquired    Gastro-esophageal reflux disease without esophagitis    Hypertensive heart disease without heart failure    Hypomagnesemia    Other malaise    Other specified diseases of liver    Pure hypercholesterolemia    Sciatica    Spinal stenosis, site unspecified    Urge incontinence of urine    Vitamin D deficiency       Allergies   Allergen Reactions    Cortisone Palpitations    Corticosteroids Other (See Comments)    Lisinopril      Other reaction(s):

## 2024-01-16 NOTE — PATIENT INSTRUCTIONS
Continue all your medications at current doses.   Increase the toprol to 25 mg twice daily.   We will schedule 1 week monitor.   Restrict sodium intake to less than 2-2.5 g/day. Restrict fluid intake to less than 2.2 L/day. Goal BP is less than 130/80.   If your weight increases by > 2 lbs in a day or >5 lbs in more than a day, take an extra lasix pill.   Please try to exercise for 150 minutes a week.   I will see you back in the office in 6 months with Dr. Restrepo. Please call the office at ([phone], option [phone]) if you have any questions.

## 2024-01-16 NOTE — PROGRESS NOTES
Patient was seen today for a heart monitor placement ordered by Dr. calix    Monitor type: zio xt   Duration: 7 days  Serial number: [id number]    The monitor was applied and instructions were given.    Bhavna Manjarrez MA

## 2024-02-21 DIAGNOSIS — I48.0 PAROXYSMAL ATRIAL FIBRILLATION (HCC): ICD-10-CM

## 2024-02-29 ENCOUNTER — OFFICE VISIT (OUTPATIENT)
Dept: CARDIOLOGY CLINIC | Age: 84
End: 2024-02-29
Payer: MEDICARE

## 2024-02-29 VITALS
BODY MASS INDEX: 28.34 KG/M2 | DIASTOLIC BLOOD PRESSURE: 75 MMHG | SYSTOLIC BLOOD PRESSURE: 135 MMHG | WEIGHT: 154 LBS | HEIGHT: 62 IN | HEART RATE: 53 BPM | RESPIRATION RATE: 18 BRPM

## 2024-02-29 DIAGNOSIS — I11.9 HYPERTENSIVE HEART DISEASE WITHOUT HEART FAILURE: Primary | ICD-10-CM

## 2024-02-29 PROCEDURE — G8417 CALC BMI ABV UP PARAM F/U: HCPCS | Performed by: INTERNAL MEDICINE

## 2024-02-29 PROCEDURE — 93000 ELECTROCARDIOGRAM COMPLETE: CPT | Performed by: INTERNAL MEDICINE

## 2024-02-29 PROCEDURE — G8400 PT W/DXA NO RESULTS DOC: HCPCS | Performed by: INTERNAL MEDICINE

## 2024-02-29 PROCEDURE — 1036F TOBACCO NON-USER: CPT | Performed by: INTERNAL MEDICINE

## 2024-02-29 PROCEDURE — G8484 FLU IMMUNIZE NO ADMIN: HCPCS | Performed by: INTERNAL MEDICINE

## 2024-02-29 PROCEDURE — 1090F PRES/ABSN URINE INCON ASSESS: CPT | Performed by: INTERNAL MEDICINE

## 2024-02-29 PROCEDURE — 3075F SYST BP GE 130 - 139MM HG: CPT | Performed by: INTERNAL MEDICINE

## 2024-02-29 PROCEDURE — 1123F ACP DISCUSS/DSCN MKR DOCD: CPT | Performed by: INTERNAL MEDICINE

## 2024-02-29 PROCEDURE — G8427 DOCREV CUR MEDS BY ELIG CLIN: HCPCS | Performed by: INTERNAL MEDICINE

## 2024-02-29 PROCEDURE — 3078F DIAST BP <80 MM HG: CPT | Performed by: INTERNAL MEDICINE

## 2024-02-29 PROCEDURE — 99214 OFFICE O/P EST MOD 30 MIN: CPT | Performed by: INTERNAL MEDICINE

## 2024-02-29 NOTE — PROGRESS NOTES
CHIEF COMPLAINT: PAfib    HISTORY OF PRESENT ILLNESS: Patient is a 84 y.o. female seen at the request of Sonja Acevedo MD.      No CP or SOB.     Past Medical History:   Diagnosis Date    Arthritis     Atherosclerosis of coronary artery 5/17/2022    Atrial fibrillation (HCC)     HTN (hypertension)     Pure hypercholesterolemia 2/7/2022       Patient Active Problem List   Diagnosis    Paroxysmal atrial fibrillation (HCC)    Artificial knee joint present    Essential hypertension    History of tobacco use    Arthropathy associated with neurological disorder    Osteoarthritis of multiple joints    Peripheral nerve disease    Long term current use of anticoagulant therapy    Glaucoma    Neuropathy    Sleep disorder    Atherosclerosis of coronary artery    Anemia    Charcot's joint of ankle    History of total knee replacement    Impaired fasting glucose    Primary localized osteoarthrosis of ankle and foot    Age related osteoporosis    Allergic rhinitis    Calculus of kidney    Change in bowel habit    Chronic pain    Constipation    Cutaneous abscess, unspecified    Cyst of kidney, acquired    Gastro-esophageal reflux disease without esophagitis    Hypertensive heart disease without heart failure    Hypomagnesemia    Other malaise    Other specified diseases of liver    Pure hypercholesterolemia    Sciatica    Spinal stenosis, site unspecified    Urge incontinence of urine    Vitamin D deficiency       Allergies   Allergen Reactions    Cortisone Palpitations    Corticosteroids Other (See Comments)    Lisinopril      Other reaction(s): cough    Losartan Potassium      Other reaction(s): stomach upset    Prednisone     Statins Other (See Comments)       Current Outpatient Medications   Medication Sig Dispense Refill    TOPROL XL 25 MG extended release tablet Take 1 tablet by mouth in the morning and at bedtime 120 tablet 1    nitroGLYCERIN (NITROSTAT) 0.4 MG SL tablet Place 1 tablet under the tongue every 5

## 2024-03-07 ENCOUNTER — HOSPITAL ENCOUNTER (OUTPATIENT)
Dept: HOSPITAL 83 - INJECTION | Age: 84
Discharge: HOME | End: 2024-03-07
Attending: NURSE PRACTITIONER
Payer: MEDICARE

## 2024-03-07 VITALS — DIASTOLIC BLOOD PRESSURE: 75 MMHG | SYSTOLIC BLOOD PRESSURE: 159 MMHG

## 2024-03-07 DIAGNOSIS — I48.91: ICD-10-CM

## 2024-03-07 DIAGNOSIS — K21.9: ICD-10-CM

## 2024-03-07 DIAGNOSIS — M81.0: Primary | ICD-10-CM

## 2024-03-07 DIAGNOSIS — I10: ICD-10-CM

## 2024-03-07 DIAGNOSIS — G62.9: ICD-10-CM

## 2024-03-18 ENCOUNTER — TELEPHONE (OUTPATIENT)
Dept: CARDIOLOGY CLINIC | Age: 84
End: 2024-03-18

## 2024-03-18 NOTE — TELEPHONE ENCOUNTER
Radha Dumont     Called patient she is currently taking toprol to 25 mg BID.    Restrepo patient last seen in office  2-29-24

## 2024-03-18 NOTE — TELEPHONE ENCOUNTER
----- Message from Jensen Dumont MD sent at 3/13/2024  8:37 AM EDT -----  She is having breakthrough atrial fibrillation that is symptomatic (3% burden). Lets increase the toprol to 25 mg BID and see how she does. She should call us if she continues to have symptoms.

## 2024-05-20 ENCOUNTER — HOSPITAL ENCOUNTER (OUTPATIENT)
Dept: HOSPITAL 83 - ORTHO | Age: 84
Discharge: HOME | End: 2024-05-20
Attending: ORTHOPAEDIC SURGERY
Payer: MEDICARE

## 2024-05-20 DIAGNOSIS — M17.12: Primary | ICD-10-CM

## 2024-05-20 DIAGNOSIS — M25.562: ICD-10-CM

## 2024-05-20 DIAGNOSIS — M25.462: ICD-10-CM

## 2024-08-23 ENCOUNTER — HOSPITAL ENCOUNTER (OUTPATIENT)
Dept: HOSPITAL 83 - RAD | Age: 84
Discharge: HOME | End: 2024-08-23
Attending: NURSE PRACTITIONER
Payer: MEDICARE

## 2024-08-23 DIAGNOSIS — M25.551: ICD-10-CM

## 2024-08-23 DIAGNOSIS — M54.41: ICD-10-CM

## 2024-08-23 DIAGNOSIS — M48.061: ICD-10-CM

## 2024-08-23 DIAGNOSIS — M47.26: Primary | ICD-10-CM

## 2024-08-28 ENCOUNTER — OFFICE VISIT (OUTPATIENT)
Dept: CARDIOLOGY CLINIC | Age: 84
End: 2024-08-28
Payer: MEDICARE

## 2024-08-28 VITALS
WEIGHT: 147.8 LBS | HEIGHT: 61 IN | DIASTOLIC BLOOD PRESSURE: 80 MMHG | SYSTOLIC BLOOD PRESSURE: 134 MMHG | BODY MASS INDEX: 27.9 KG/M2 | HEART RATE: 91 BPM | RESPIRATION RATE: 18 BRPM

## 2024-08-28 DIAGNOSIS — I10 ESSENTIAL HYPERTENSION: Primary | ICD-10-CM

## 2024-08-28 PROCEDURE — G8417 CALC BMI ABV UP PARAM F/U: HCPCS | Performed by: INTERNAL MEDICINE

## 2024-08-28 PROCEDURE — G8427 DOCREV CUR MEDS BY ELIG CLIN: HCPCS | Performed by: INTERNAL MEDICINE

## 2024-08-28 PROCEDURE — 3079F DIAST BP 80-89 MM HG: CPT | Performed by: INTERNAL MEDICINE

## 2024-08-28 PROCEDURE — G8400 PT W/DXA NO RESULTS DOC: HCPCS | Performed by: INTERNAL MEDICINE

## 2024-08-28 PROCEDURE — 99214 OFFICE O/P EST MOD 30 MIN: CPT | Performed by: INTERNAL MEDICINE

## 2024-08-28 PROCEDURE — 1036F TOBACCO NON-USER: CPT | Performed by: INTERNAL MEDICINE

## 2024-08-28 PROCEDURE — 3075F SYST BP GE 130 - 139MM HG: CPT | Performed by: INTERNAL MEDICINE

## 2024-08-28 PROCEDURE — 1123F ACP DISCUSS/DSCN MKR DOCD: CPT | Performed by: INTERNAL MEDICINE

## 2024-08-28 PROCEDURE — 1090F PRES/ABSN URINE INCON ASSESS: CPT | Performed by: INTERNAL MEDICINE

## 2024-08-28 PROCEDURE — 93000 ELECTROCARDIOGRAM COMPLETE: CPT | Performed by: INTERNAL MEDICINE

## 2024-08-28 NOTE — PROGRESS NOTES
CHIEF COMPLAINT: PAfib    HISTORY OF PRESENT ILLNESS: Patient is a 84 y.o. female seen at the request of Sonja Acevedo MD.      No CP or SOB.     Past Medical History:   Diagnosis Date    Arthritis     Atherosclerosis of coronary artery 5/17/2022    Atrial fibrillation (HCC)     HTN (hypertension)     Pure hypercholesterolemia 2/7/2022       Patient Active Problem List   Diagnosis    Paroxysmal atrial fibrillation (HCC)    Artificial knee joint present    Essential hypertension    History of tobacco use    Arthropathy associated with neurological disorder    Osteoarthritis of multiple joints    Peripheral nerve disease    Long term current use of anticoagulant therapy    Glaucoma    Neuropathy    Sleep disorder    Atherosclerosis of coronary artery    Anemia    Charcot's joint of ankle    History of total knee replacement    Impaired fasting glucose    Primary localized osteoarthrosis of ankle and foot    Age related osteoporosis    Allergic rhinitis    Calculus of kidney    Change in bowel habit    Chronic pain    Constipation    Cutaneous abscess, unspecified    Cyst of kidney, acquired    Gastro-esophageal reflux disease without esophagitis    Hypertensive heart disease without heart failure    Hypomagnesemia    Other malaise    Other specified diseases of liver    Pure hypercholesterolemia    Sciatica    Spinal stenosis, site unspecified    Urge incontinence of urine    Vitamin D deficiency       Allergies   Allergen Reactions    Cortisone Palpitations    Corticosteroids Other (See Comments)    Lisinopril      Other reaction(s): cough    Losartan Potassium      Other reaction(s): stomach upset    Prednisone     Statins Other (See Comments)       Current Outpatient Medications   Medication Sig Dispense Refill    TOPROL XL 25 MG extended release tablet Take 1 tablet by mouth in the morning and at bedtime 120 tablet 1    nitroGLYCERIN (NITROSTAT) 0.4 MG SL tablet Place 1 tablet under the tongue every 5  minutes as needed for Chest pain (take 1 tablet every 5 minutes for up to 15 minutes not to exceed 3 tablets) 25 tablet 3    ascorbic acid (VITAMIN C) 1000 MG tablet Take 1 tablet by mouth daily      Calcium Polycarbophil (FIBER) 625 MG TABS Take by mouth      Cholecalciferol (VITAMIN D) 125 MCG (5000 UT) CAPS Take by mouth      rivaroxaban (XARELTO) 20 MG TABS tablet Take 1 tablet by mouth daily (with breakfast) 30 tablet 0    gabapentin (NEURONTIN) 300 MG capsule Take 1 capsule by mouth daily.  3    Multiple Vitamins-Minerals (MULTIVITAMIN ADULT PO) Take by mouth daily      amLODIPine (NORVASC) 5 MG tablet TAKE ONE TABLET BY MOUTH ONCE DAILY  3     No current facility-administered medications for this visit.       Social History     Socioeconomic History    Marital status: Single     Spouse name: Not on file    Number of children: Not on file    Years of education: Not on file    Highest education level: Not on file   Occupational History    Not on file   Tobacco Use    Smoking status: Former     Current packs/day: 0.00     Average packs/day: 1 pack/day for 40.0 years (40.0 ttl pk-yrs)     Types: Cigarettes     Start date:      Quit date:      Years since quittin.6    Smokeless tobacco: Never   Vaping Use    Vaping status: Never Used   Substance and Sexual Activity    Alcohol use: Yes     Alcohol/week: 1.0 standard drink of alcohol     Types: 1 Glasses of wine per week     Comment: socially    Drug use: No    Sexual activity: Not on file   Other Topics Concern    Not on file   Social History Narrative    Not on file     Social Determinants of Health     Financial Resource Strain: Not on file   Food Insecurity: Not on file   Transportation Needs: Not on file   Physical Activity: Not on file   Stress: Not on file   Social Connections: Not on file   Intimate Partner Violence: Not on file   Housing Stability: Not on file       No family history on file.    Review of Systems:  Heart: as above   Lungs: as

## 2024-09-09 ENCOUNTER — HOSPITAL ENCOUNTER (OUTPATIENT)
Dept: HOSPITAL 83 - INJECTION | Age: 84
End: 2024-09-09
Attending: NURSE PRACTITIONER
Payer: MEDICARE

## 2024-09-09 VITALS — DIASTOLIC BLOOD PRESSURE: 83 MMHG

## 2024-09-09 DIAGNOSIS — I48.91: ICD-10-CM

## 2024-09-09 DIAGNOSIS — M81.0: Primary | ICD-10-CM

## 2024-12-03 ENCOUNTER — HOSPITAL ENCOUNTER (EMERGENCY)
Dept: HOSPITAL 83 - ED | Age: 84
Discharge: HOME | End: 2024-12-03
Payer: MEDICARE

## 2024-12-03 VITALS — DIASTOLIC BLOOD PRESSURE: 89 MMHG

## 2024-12-03 VITALS — BODY MASS INDEX: 27.75 KG/M2 | WEIGHT: 147 LBS | HEIGHT: 60.98 IN
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
## 2024-12-03 DIAGNOSIS — Z79.899: ICD-10-CM

## 2024-12-03 DIAGNOSIS — S00.03XA: Primary | ICD-10-CM
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
## 2024-12-03 DIAGNOSIS — I48.91: ICD-10-CM
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
## 2024-12-03 DIAGNOSIS — Z88.8: ICD-10-CM

## 2024-12-03 DIAGNOSIS — I10: ICD-10-CM

## 2024-12-03 LAB
KETONES UR QL STRIP: (no result)
PH UR STRIP: 7 [PH] (ref 4.5–8)
RBC #/AREA URNS HPF: (no result) RBC/HPF (ref 0–2)
SP GR UR: 1.01 (ref 1–1.03)
UROBILINOGEN UR STRIP-MCNC: 0.2 E.U./DL (ref 0–1)
WBC #/AREA URNS HPF: (no result) WBC/HPF (ref 0–5)

## 2024-12-18 ENCOUNTER — HOSPITAL ENCOUNTER (OUTPATIENT)
Dept: HOSPITAL 83 - RAD | Age: 84
Discharge: HOME | End: 2024-12-18
Attending: NURSE PRACTITIONER
Payer: MEDICARE

## 2024-12-18 DIAGNOSIS — G89.29: ICD-10-CM

## 2024-12-18 DIAGNOSIS — M48.02: ICD-10-CM

## 2024-12-18 DIAGNOSIS — M54.9: ICD-10-CM

## 2024-12-18 DIAGNOSIS — M50.322: Primary | ICD-10-CM

## 2025-01-20 ENCOUNTER — HOSPITAL ENCOUNTER (OUTPATIENT)
Dept: HOSPITAL 83 - MRI | Age: 85
Discharge: HOME | End: 2025-01-20
Payer: MEDICARE

## 2025-01-20 DIAGNOSIS — M47.22: Primary | ICD-10-CM

## 2025-01-20 DIAGNOSIS — M48.02: ICD-10-CM

## 2025-01-20 DIAGNOSIS — M43.12: ICD-10-CM

## 2025-01-21 ENCOUNTER — HOSPITAL ENCOUNTER (OUTPATIENT)
Dept: HOSPITAL 83 - RAD | Age: 85
Discharge: HOME | End: 2025-01-21
Attending: NURSE PRACTITIONER
Payer: MEDICARE

## 2025-01-21 DIAGNOSIS — M19.012: Primary | ICD-10-CM

## 2025-03-10 ENCOUNTER — HOSPITAL ENCOUNTER (OUTPATIENT)
Dept: HOSPITAL 83 - RAD | Age: 85
Discharge: HOME | End: 2025-03-10
Attending: NURSE PRACTITIONER
Payer: MEDICARE

## 2025-03-10 DIAGNOSIS — Z13.820: Primary | ICD-10-CM

## 2025-03-10 DIAGNOSIS — M81.0: ICD-10-CM

## 2025-03-14 ENCOUNTER — HOSPITAL ENCOUNTER (OUTPATIENT)
Dept: HOSPITAL 83 - INJECTION | Age: 85
Discharge: HOME | End: 2025-03-14
Attending: NURSE PRACTITIONER
Payer: MEDICARE

## 2025-03-14 VITALS — DIASTOLIC BLOOD PRESSURE: 69 MMHG

## 2025-03-14 DIAGNOSIS — I10: ICD-10-CM

## 2025-03-14 DIAGNOSIS — E78.00: ICD-10-CM

## 2025-03-14 DIAGNOSIS — K21.9: ICD-10-CM

## 2025-03-14 DIAGNOSIS — Z87.891: ICD-10-CM

## 2025-03-14 DIAGNOSIS — M81.0: Primary | ICD-10-CM

## 2025-03-14 DIAGNOSIS — I48.91: ICD-10-CM

## 2025-05-30 ENCOUNTER — HOSPITAL ENCOUNTER (OUTPATIENT)
Dept: HOSPITAL 83 - MRI | Age: 85
Discharge: HOME | End: 2025-05-30
Attending: NURSE PRACTITIONER
Payer: MEDICARE

## 2025-05-30 DIAGNOSIS — R41.3: ICD-10-CM

## 2025-05-30 DIAGNOSIS — R68.89: ICD-10-CM

## 2025-05-30 DIAGNOSIS — I67.82: Primary | ICD-10-CM

## 2025-06-27 ENCOUNTER — OFFICE VISIT (OUTPATIENT)
Dept: CARDIOLOGY CLINIC | Age: 85
End: 2025-06-27
Payer: MEDICARE

## 2025-06-27 VITALS
TEMPERATURE: 96.9 F | SYSTOLIC BLOOD PRESSURE: 114 MMHG | BODY MASS INDEX: 26.15 KG/M2 | WEIGHT: 142.1 LBS | HEART RATE: 57 BPM | RESPIRATION RATE: 18 BRPM | OXYGEN SATURATION: 95 % | HEIGHT: 62 IN | DIASTOLIC BLOOD PRESSURE: 70 MMHG

## 2025-06-27 DIAGNOSIS — I48.0 PAROXYSMAL ATRIAL FIBRILLATION (HCC): Primary | ICD-10-CM

## 2025-06-27 DIAGNOSIS — I10 ESSENTIAL HYPERTENSION: ICD-10-CM

## 2025-06-27 PROCEDURE — G8427 DOCREV CUR MEDS BY ELIG CLIN: HCPCS | Performed by: INTERNAL MEDICINE

## 2025-06-27 PROCEDURE — 1036F TOBACCO NON-USER: CPT | Performed by: INTERNAL MEDICINE

## 2025-06-27 PROCEDURE — 3078F DIAST BP <80 MM HG: CPT | Performed by: INTERNAL MEDICINE

## 2025-06-27 PROCEDURE — 3074F SYST BP LT 130 MM HG: CPT | Performed by: INTERNAL MEDICINE

## 2025-06-27 PROCEDURE — 93000 ELECTROCARDIOGRAM COMPLETE: CPT | Performed by: INTERNAL MEDICINE

## 2025-06-27 PROCEDURE — 1090F PRES/ABSN URINE INCON ASSESS: CPT | Performed by: INTERNAL MEDICINE

## 2025-06-27 PROCEDURE — 1159F MED LIST DOCD IN RCRD: CPT | Performed by: INTERNAL MEDICINE

## 2025-06-27 PROCEDURE — 1123F ACP DISCUSS/DSCN MKR DOCD: CPT | Performed by: INTERNAL MEDICINE

## 2025-06-27 PROCEDURE — G8400 PT W/DXA NO RESULTS DOC: HCPCS | Performed by: INTERNAL MEDICINE

## 2025-06-27 PROCEDURE — 99214 OFFICE O/P EST MOD 30 MIN: CPT | Performed by: INTERNAL MEDICINE

## 2025-06-27 PROCEDURE — G2211 COMPLEX E/M VISIT ADD ON: HCPCS | Performed by: INTERNAL MEDICINE

## 2025-06-27 PROCEDURE — G8417 CALC BMI ABV UP PARAM F/U: HCPCS | Performed by: INTERNAL MEDICINE

## 2025-06-27 RX ORDER — VIBEGRON 75 MG/1
75 TABLET, FILM COATED ORAL DAILY
COMMUNITY

## 2025-06-27 NOTE — PROGRESS NOTES
CHIEF COMPLAINT: PAfib    HISTORY OF PRESENT ILLNESS: Patient is a 85 y.o. female seen at the request of Konstantin Wynn APRN - NP.      No CP or SOB.     Past Medical History:   Diagnosis Date    Arthritis     Atherosclerosis of coronary artery 5/17/2022    Atrial fibrillation (HCC)     HTN (hypertension)     Pure hypercholesterolemia 2/7/2022       Patient Active Problem List   Diagnosis    Paroxysmal atrial fibrillation (HCC)    Artificial knee joint present    Essential hypertension    History of tobacco use    Arthropathy associated with neurological disorder    Osteoarthritis of multiple joints    Peripheral nerve disease    Long term current use of anticoagulant therapy    Glaucoma    Neuropathy    Sleep disorder    Atherosclerosis of coronary artery    Anemia    Charcot's joint of ankle    History of total knee replacement    Impaired fasting glucose    Primary localized osteoarthrosis of ankle and foot    Age related osteoporosis    Allergic rhinitis    Calculus of kidney    Change in bowel habit    Chronic pain    Constipation    Cutaneous abscess, unspecified    Cyst of kidney, acquired    Gastro-esophageal reflux disease without esophagitis    Hypertensive heart disease without heart failure    Hypomagnesemia    Other malaise    Other specified diseases of liver    Pure hypercholesterolemia    Sciatica    Spinal stenosis, site unspecified    Urge incontinence of urine    Vitamin D deficiency       Allergies   Allergen Reactions    Cortisone Palpitations    Corticosteroids Other (See Comments)    Lisinopril      Other reaction(s): cough    Losartan Potassium      Other reaction(s): stomach upset    Prednisone     Statins Other (See Comments)       Current Outpatient Medications   Medication Sig Dispense Refill    vibegron (GEMTESA) 75 MG TABS tablet Take 1 tablet by mouth daily      TOPROL XL 25 MG extended release tablet Take 1 tablet by mouth in the morning and at bedtime 120 tablet 1    ascorbic

## 2025-07-29 ENCOUNTER — TELEPHONE (OUTPATIENT)
Dept: CARDIOLOGY CLINIC | Age: 85
End: 2025-07-29

## 2025-07-29 NOTE — TELEPHONE ENCOUNTER
Patient needs cardiac clearance for a knee surgery that would be scheduled with , patient was seen in 6/2025, she will need instructions regarding Xarelto, please advise

## 2025-07-29 NOTE — TELEPHONE ENCOUNTER
Patient is an acceptable risk to proceed. Okay to hold Xarelto for 5 days prior to surgery.     Beverly Restreop D.O.  Cardiologist  Cardiology, [facility]